# Patient Record
Sex: FEMALE | NOT HISPANIC OR LATINO | URBAN - METROPOLITAN AREA
[De-identification: names, ages, dates, MRNs, and addresses within clinical notes are randomized per-mention and may not be internally consistent; named-entity substitution may affect disease eponyms.]

---

## 2017-10-16 ENCOUNTER — INPATIENT (INPATIENT)
Facility: HOSPITAL | Age: 82
LOS: 7 days | Discharge: NURSING, REHAB OR LONG-TERM CARE | DRG: 185 | End: 2017-10-24
Attending: STUDENT IN AN ORGANIZED HEALTH CARE EDUCATION/TRAINING PROGRAM | Admitting: STUDENT IN AN ORGANIZED HEALTH CARE EDUCATION/TRAINING PROGRAM
Payer: MEDICARE

## 2017-10-16 VITALS
RESPIRATION RATE: 18 BRPM | TEMPERATURE: 98 F | OXYGEN SATURATION: 98 % | WEIGHT: 139.99 LBS | HEIGHT: 63 IN | HEART RATE: 68 BPM | DIASTOLIC BLOOD PRESSURE: 77 MMHG | SYSTOLIC BLOOD PRESSURE: 129 MMHG

## 2017-10-16 DIAGNOSIS — Z90.710 ACQUIRED ABSENCE OF BOTH CERVIX AND UTERUS: Chronic | ICD-10-CM

## 2017-10-16 DIAGNOSIS — Z90.49 ACQUIRED ABSENCE OF OTHER SPECIFIED PARTS OF DIGESTIVE TRACT: Chronic | ICD-10-CM

## 2017-10-16 DIAGNOSIS — S22.49XA MULTIPLE FRACTURES OF RIBS, UNSPECIFIED SIDE, INITIAL ENCOUNTER FOR CLOSED FRACTURE: ICD-10-CM

## 2017-10-16 LAB
ALBUMIN SERPL ELPH-MCNC: 3.9 G/DL — SIGNIFICANT CHANGE UP (ref 3.3–5.2)
ALLERGY+IMMUNOLOGY DIAG STUDY NOTE: SIGNIFICANT CHANGE UP
ALP SERPL-CCNC: 50 U/L — SIGNIFICANT CHANGE UP (ref 40–120)
ALT FLD-CCNC: 15 U/L — SIGNIFICANT CHANGE UP
ANION GAP SERPL CALC-SCNC: 15 MMOL/L — SIGNIFICANT CHANGE UP (ref 5–17)
APTT BLD: 33.6 SEC — SIGNIFICANT CHANGE UP (ref 27.5–37.4)
AST SERPL-CCNC: 17 U/L — SIGNIFICANT CHANGE UP
BASOPHILS # BLD AUTO: 0 K/UL — SIGNIFICANT CHANGE UP (ref 0–0.2)
BASOPHILS NFR BLD AUTO: 0.5 % — SIGNIFICANT CHANGE UP (ref 0–2)
BILIRUB SERPL-MCNC: 0.4 MG/DL — SIGNIFICANT CHANGE UP (ref 0.4–2)
BLD GP AB SCN SERPL QL: ABNORMAL
BUN SERPL-MCNC: 33 MG/DL — HIGH (ref 8–20)
CALCIUM SERPL-MCNC: 8.7 MG/DL — SIGNIFICANT CHANGE UP (ref 8.6–10.2)
CHLORIDE SERPL-SCNC: 98 MMOL/L — SIGNIFICANT CHANGE UP (ref 98–107)
CK SERPL-CCNC: 66 U/L — SIGNIFICANT CHANGE UP (ref 25–170)
CO2 SERPL-SCNC: 23 MMOL/L — SIGNIFICANT CHANGE UP (ref 22–29)
CREAT SERPL-MCNC: 1.58 MG/DL — HIGH (ref 0.5–1.3)
DIR ANTIGLOB POLYSPECIFIC INTERPRETATION: SIGNIFICANT CHANGE UP
EOSINOPHIL # BLD AUTO: 0.2 K/UL — SIGNIFICANT CHANGE UP (ref 0–0.5)
EOSINOPHIL NFR BLD AUTO: 2 % — SIGNIFICANT CHANGE UP (ref 0–6)
GLUCOSE SERPL-MCNC: 181 MG/DL — HIGH (ref 70–115)
HCT VFR BLD CALC: 34.1 % — LOW (ref 37–47)
HGB BLD-MCNC: 11 G/DL — LOW (ref 12–16)
INR BLD: 1.1 RATIO — SIGNIFICANT CHANGE UP (ref 0.88–1.16)
LYMPHOCYTES # BLD AUTO: 1.9 K/UL — SIGNIFICANT CHANGE UP (ref 1–4.8)
LYMPHOCYTES # BLD AUTO: 17.9 % — LOW (ref 20–55)
MCHC RBC-ENTMCNC: 31 PG — SIGNIFICANT CHANGE UP (ref 27–31)
MCHC RBC-ENTMCNC: 32.3 G/DL — SIGNIFICANT CHANGE UP (ref 32–36)
MCV RBC AUTO: 96.1 FL — SIGNIFICANT CHANGE UP (ref 81–99)
MONOCYTES # BLD AUTO: 0.6 K/UL — SIGNIFICANT CHANGE UP (ref 0–0.8)
MONOCYTES NFR BLD AUTO: 5.4 % — SIGNIFICANT CHANGE UP (ref 3–10)
NEUTROPHILS # BLD AUTO: 7.8 K/UL — SIGNIFICANT CHANGE UP (ref 1.8–8)
NEUTROPHILS NFR BLD AUTO: 73.9 % — HIGH (ref 37–73)
NT-PROBNP SERPL-SCNC: 726 PG/ML — HIGH (ref 0–300)
PLATELET # BLD AUTO: 414 K/UL — HIGH (ref 150–400)
POTASSIUM SERPL-MCNC: 4.7 MMOL/L — SIGNIFICANT CHANGE UP (ref 3.5–5.3)
POTASSIUM SERPL-SCNC: 4.7 MMOL/L — SIGNIFICANT CHANGE UP (ref 3.5–5.3)
PROT SERPL-MCNC: 7.2 G/DL — SIGNIFICANT CHANGE UP (ref 6.6–8.7)
PROTHROM AB SERPL-ACNC: 12.1 SEC — SIGNIFICANT CHANGE UP (ref 9.8–12.7)
RBC # BLD: 3.55 M/UL — LOW (ref 4.4–5.2)
RBC # FLD: 16.2 % — HIGH (ref 11–15.6)
SODIUM SERPL-SCNC: 136 MMOL/L — SIGNIFICANT CHANGE UP (ref 135–145)
TROPONIN T SERPL-MCNC: <0.01 NG/ML — SIGNIFICANT CHANGE UP (ref 0–0.06)
TYPE + AB SCN PNL BLD: SIGNIFICANT CHANGE UP
WBC # BLD: 10.5 K/UL — SIGNIFICANT CHANGE UP (ref 4.8–10.8)
WBC # FLD AUTO: 10.5 K/UL — SIGNIFICANT CHANGE UP (ref 4.8–10.8)

## 2017-10-16 PROCEDURE — 70450 CT HEAD/BRAIN W/O DYE: CPT | Mod: 26

## 2017-10-16 PROCEDURE — 74177 CT ABD & PELVIS W/CONTRAST: CPT | Mod: 26

## 2017-10-16 PROCEDURE — 71010: CPT | Mod: 26

## 2017-10-16 PROCEDURE — 99291 CRITICAL CARE FIRST HOUR: CPT

## 2017-10-16 PROCEDURE — 86077 PHYS BLOOD BANK SERV XMATCH: CPT

## 2017-10-16 PROCEDURE — 71260 CT THORAX DX C+: CPT | Mod: 26

## 2017-10-16 PROCEDURE — 72125 CT NECK SPINE W/O DYE: CPT | Mod: 26

## 2017-10-16 RX ORDER — GABAPENTIN 400 MG/1
300 CAPSULE ORAL EVERY 8 HOURS
Qty: 0 | Refills: 0 | Status: DISCONTINUED | OUTPATIENT
Start: 2017-10-16 | End: 2017-10-24

## 2017-10-16 RX ORDER — ICOSAPENT ETHYL 500 MG/1
0 CAPSULE, LIQUID FILLED ORAL
Qty: 0 | Refills: 0 | COMMUNITY

## 2017-10-16 RX ORDER — HEPARIN SODIUM 5000 [USP'U]/ML
5000 INJECTION INTRAVENOUS; SUBCUTANEOUS EVERY 8 HOURS
Qty: 0 | Refills: 0 | Status: DISCONTINUED | OUTPATIENT
Start: 2017-10-16 | End: 2017-10-24

## 2017-10-16 RX ORDER — ASPIRIN/CALCIUM CARB/MAGNESIUM 324 MG
0 TABLET ORAL
Qty: 0 | Refills: 0 | COMMUNITY

## 2017-10-16 RX ORDER — PANTOPRAZOLE SODIUM 20 MG/1
40 TABLET, DELAYED RELEASE ORAL
Qty: 0 | Refills: 0 | Status: DISCONTINUED | OUTPATIENT
Start: 2017-10-16 | End: 2017-10-24

## 2017-10-16 RX ORDER — LABETALOL HCL 100 MG
10 TABLET ORAL ONCE
Qty: 0 | Refills: 0 | Status: COMPLETED | OUTPATIENT
Start: 2017-10-16 | End: 2017-10-16

## 2017-10-16 RX ORDER — LABETALOL HCL 100 MG
200 TABLET ORAL EVERY 12 HOURS
Qty: 0 | Refills: 0 | Status: DISCONTINUED | OUTPATIENT
Start: 2017-10-16 | End: 2017-10-24

## 2017-10-16 RX ORDER — ACETAMINOPHEN 500 MG
975 TABLET ORAL EVERY 8 HOURS
Qty: 0 | Refills: 0 | Status: DISCONTINUED | OUTPATIENT
Start: 2017-10-16 | End: 2017-10-24

## 2017-10-16 RX ORDER — LABETALOL HCL 100 MG
0 TABLET ORAL
Qty: 0 | Refills: 0 | COMMUNITY

## 2017-10-16 RX ORDER — ACETAMINOPHEN 500 MG
650 TABLET ORAL ONCE
Qty: 0 | Refills: 0 | Status: COMPLETED | OUTPATIENT
Start: 2017-10-16 | End: 2017-10-16

## 2017-10-16 RX ORDER — ASPIRIN/CALCIUM CARB/MAGNESIUM 324 MG
81 TABLET ORAL DAILY
Qty: 0 | Refills: 0 | Status: DISCONTINUED | OUTPATIENT
Start: 2017-10-16 | End: 2017-10-24

## 2017-10-16 RX ORDER — MORPHINE SULFATE 50 MG/1
2 CAPSULE, EXTENDED RELEASE ORAL ONCE
Qty: 0 | Refills: 0 | Status: DISCONTINUED | OUTPATIENT
Start: 2017-10-16 | End: 2017-10-16

## 2017-10-16 RX ORDER — KETOROLAC TROMETHAMINE 30 MG/ML
10 SYRINGE (ML) INJECTION ONCE
Qty: 0 | Refills: 0 | Status: DISCONTINUED | OUTPATIENT
Start: 2017-10-16 | End: 2017-10-16

## 2017-10-16 RX ORDER — LIDOCAINE 4 G/100G
2 CREAM TOPICAL EVERY 24 HOURS
Qty: 0 | Refills: 0 | Status: DISCONTINUED | OUTPATIENT
Start: 2017-10-16 | End: 2017-10-24

## 2017-10-16 RX ORDER — OMEPRAZOLE 10 MG/1
0 CAPSULE, DELAYED RELEASE ORAL
Qty: 0 | Refills: 0 | COMMUNITY

## 2017-10-16 RX ORDER — ONDANSETRON 8 MG/1
4 TABLET, FILM COATED ORAL ONCE
Qty: 0 | Refills: 0 | Status: COMPLETED | OUTPATIENT
Start: 2017-10-16 | End: 2017-10-16

## 2017-10-16 RX ADMIN — Medication 200 MILLIGRAM(S): at 18:07

## 2017-10-16 RX ADMIN — LIDOCAINE 2 PATCH: 4 CREAM TOPICAL at 18:06

## 2017-10-16 RX ADMIN — Medication 975 MILLIGRAM(S): at 21:10

## 2017-10-16 RX ADMIN — ONDANSETRON 4 MILLIGRAM(S): 8 TABLET, FILM COATED ORAL at 12:33

## 2017-10-16 RX ADMIN — Medication 650 MILLIGRAM(S): at 09:43

## 2017-10-16 RX ADMIN — GABAPENTIN 300 MILLIGRAM(S): 400 CAPSULE ORAL at 22:26

## 2017-10-16 RX ADMIN — HEPARIN SODIUM 5000 UNIT(S): 5000 INJECTION INTRAVENOUS; SUBCUTANEOUS at 22:27

## 2017-10-16 RX ADMIN — MORPHINE SULFATE 2 MILLIGRAM(S): 50 CAPSULE, EXTENDED RELEASE ORAL at 13:30

## 2017-10-16 RX ADMIN — Medication 10 MILLIGRAM(S): at 19:30

## 2017-10-16 RX ADMIN — MORPHINE SULFATE 2 MILLIGRAM(S): 50 CAPSULE, EXTENDED RELEASE ORAL at 13:15

## 2017-10-16 RX ADMIN — Medication 10 MILLIGRAM(S): at 16:42

## 2017-10-16 RX ADMIN — Medication 10 MILLIGRAM(S): at 18:07

## 2017-10-16 RX ADMIN — Medication 975 MILLIGRAM(S): at 21:40

## 2017-10-16 NOTE — H&P ADULT - HISTORY OF PRESENT ILLNESS
89 y/o F presents to the ED s/p fall. She states she fell friday where she went to an urgent care and wastold she had 1-2 rib fractures and to follow up with her primary care doctor. She came to the ED due to progression of pain to the right side with SOB. She states she lost her balance and fell backwards landing on her right side. Denies syncope, LOC, hitting her head, nausea, emesis, fevers, chills. She pulls 500cc on IS.   Pupils reactive b/l 4mm, GCS 15  A: airway is intact  B: b/l course breath sounds  C; RRR

## 2017-10-16 NOTE — ED PROVIDER NOTE - CARE PLAN
Principal Discharge DX:	Fracture of ribs, five Principal Discharge DX:	Fracture of ribs, five  Secondary Diagnosis:	Fall  Secondary Diagnosis:	Hypoxemia

## 2017-10-16 NOTE — ED PROVIDER NOTE - PROGRESS NOTE DETAILS
spoek with radiology they are to amend repost showing rib fx. spoke with trauma team they are to see patient pain currently controlled and family aware of plan of care

## 2017-10-16 NOTE — ED PROVIDER NOTE - MEDICAL DECISION MAKING DETAILS
hhigih risk rib fx hypoexmiea need for admission pain control is trauma consulted and recs implemented. need for mutliple bedside reassessments pain control tx 200 systolic bp and evaluation for any worsening hypoxemia . family at [facility] agrees to paln of care long time education on high risk morbidity and mortality with multiple rib fx in older population

## 2017-10-16 NOTE — H&P ADULT - NSHPPHYSICALEXAM_GEN_ALL_CORE
GEb: A&Ox3, NAD  HEENT: NC on 2L , No cervical neck tenderness, EOMI, Pupils reactive b/l 4mm  Cardio: RRR  Pulm: coarse breath sounds b/l   Gastrointestinal: abd soft, ND, NT  musculoskeletal: TTP to right chest, 5/5 strength in all extremities, Distal sensation intact, distal pulses intact  Neuro: GCS 15 CN II-XII intact

## 2017-10-16 NOTE — ED ADULT TRIAGE NOTE - CHIEF COMPLAINT QUOTE
pt biba after family reports a brief period of not responding to them while sitting at table eating breakfast. pt remained awake with eyes open but did not answer, lasted about 2 seconds. pt offers no complaint at this time. hx of fall on friday with 3 broken ribs. pt a&o x3, accucecheck is:

## 2017-10-16 NOTE — ED PROVIDER NOTE - CRITICAL CARE PROVIDED
interpretation of diagnostic studies/direct patient care (not related to procedure)/additional history taking/documentation

## 2017-10-16 NOTE — ED ADULT NURSE NOTE - OBJECTIVE STATEMENT
AOX3 pt fell on Friday, was taken to urgent care and was told that she broke 3 ribs and she also had fluid in her lungs. Pt lost her balance, tripped over the curb, fell on her back and right side. Pt denies any pain at the moment, daughter states she looks like shes out of breath. This morning pt looked like she was staring into space and was not responding. Pt took her Tramadol at 0630. Blood sent to labs, pt placed on cardiac monitor

## 2017-10-16 NOTE — ED PROVIDER NOTE - MUSCULOSKELETAL, MLM
Spine appears normal, range of motion is not limited, + ttp right posterior toracic cavity no flail chest

## 2017-10-16 NOTE — ED ADULT NURSE REASSESSMENT NOTE - NS ED NURSE REASSESS COMMENT FT1
Assuming care from previous RN, pt AOx4, denies SOB, resp even and unlabored, LS clear and equal bilaterally, sat @ 98% on 2L nasal cannula, skin warm and dry, color good, patent 20G IV in left AC, denies pain/n/v at this time, showing NSR on monitor, 2 lidocaine patches on right side placed today, family @ bedside, room assigned, will call report, will continue to monitor.
Report given to receiving RN Michelle, awaiting transport to floor, pt and family aware of plan of care.
Trauma team at bedside.
patient A&OX3. Denies any pain or discomfort at this time. CM in place. NSR. pt hypertensive. MD aware. Family at bedside.
patient A&OX3. c/o mild back pain. CM in place. NSR. pt hypertensive. MD aware. Family at bedside.

## 2017-10-16 NOTE — H&P ADULT - ASSESSMENT
89 y/o F s/p fall sustaining 3 minimally displaced fractures of the right lateral seventh , eighth , ninth ribs ribs. Posterior aspect  of the right 11th and 12th ribs are also fractured and distracted. with anterior lateral hematoma near the liver   - Admit to trauma service   - Regular diet  - IS  - ABG, CXR  - Rib fracture protocol for pain  - restart home meds  - PT/OT   - OOB

## 2017-10-16 NOTE — ED PROVIDER NOTE - OBJECTIVE STATEMENT
89 y/o F presents to the ED s/p fall. She states she fell friday where she went to an urgent care and wastold she had 1-2 rib fractures and to follow up with her primary care doctor. She came to the ED due to progression of pain to the right side with SOB. She states she lost her balance and fell backwards landing on her right side. Denies syncope, LOC, hitting her head, nausea, emesis, fevers, chills. + right sided back pain constant achy worse with breathing 8/10 n o hemoptysis

## 2017-10-17 ENCOUNTER — TRANSCRIPTION ENCOUNTER (OUTPATIENT)
Age: 82
End: 2017-10-17

## 2017-10-17 LAB
BASE EXCESS BLDA CALC-SCNC: 3.5 MMOL/L — HIGH (ref -3–3)
BLOOD GAS COMMENTS ARTERIAL: SIGNIFICANT CHANGE UP
GAS PNL BLDA: SIGNIFICANT CHANGE UP
HCO3 BLDA-SCNC: 28 MMOL/L — HIGH (ref 20–26)
HOROWITZ INDEX BLDA+IHG-RTO: 2.5 — SIGNIFICANT CHANGE UP
PCO2 BLDA: 47 MMHG — HIGH (ref 35–45)
PH BLDA: 7.39 — SIGNIFICANT CHANGE UP (ref 7.35–7.45)
PO2 BLDA: 94 MMHG — SIGNIFICANT CHANGE UP (ref 83–108)
SAO2 % BLDA: 98 % — SIGNIFICANT CHANGE UP (ref 95–99)

## 2017-10-17 RX ORDER — FUROSEMIDE 40 MG
20 TABLET ORAL EVERY OTHER DAY
Qty: 0 | Refills: 0 | Status: DISCONTINUED | OUTPATIENT
Start: 2017-10-17 | End: 2017-10-20

## 2017-10-17 RX ORDER — FUROSEMIDE 40 MG
40 TABLET ORAL EVERY OTHER DAY
Qty: 0 | Refills: 0 | Status: DISCONTINUED | OUTPATIENT
Start: 2017-10-17 | End: 2017-10-20

## 2017-10-17 RX ADMIN — GABAPENTIN 300 MILLIGRAM(S): 400 CAPSULE ORAL at 21:17

## 2017-10-17 RX ADMIN — HEPARIN SODIUM 5000 UNIT(S): 5000 INJECTION INTRAVENOUS; SUBCUTANEOUS at 15:44

## 2017-10-17 RX ADMIN — Medication 975 MILLIGRAM(S): at 06:21

## 2017-10-17 RX ADMIN — Medication 200 MILLIGRAM(S): at 17:47

## 2017-10-17 RX ADMIN — Medication 200 MILLIGRAM(S): at 06:21

## 2017-10-17 RX ADMIN — Medication 975 MILLIGRAM(S): at 16:30

## 2017-10-17 RX ADMIN — Medication 975 MILLIGRAM(S): at 15:44

## 2017-10-17 RX ADMIN — LIDOCAINE 2 PATCH: 4 CREAM TOPICAL at 05:15

## 2017-10-17 RX ADMIN — Medication 975 MILLIGRAM(S): at 21:17

## 2017-10-17 RX ADMIN — HEPARIN SODIUM 5000 UNIT(S): 5000 INJECTION INTRAVENOUS; SUBCUTANEOUS at 21:17

## 2017-10-17 RX ADMIN — HEPARIN SODIUM 5000 UNIT(S): 5000 INJECTION INTRAVENOUS; SUBCUTANEOUS at 06:21

## 2017-10-17 RX ADMIN — PANTOPRAZOLE SODIUM 40 MILLIGRAM(S): 20 TABLET, DELAYED RELEASE ORAL at 06:21

## 2017-10-17 RX ADMIN — GABAPENTIN 300 MILLIGRAM(S): 400 CAPSULE ORAL at 15:44

## 2017-10-17 RX ADMIN — Medication 975 MILLIGRAM(S): at 22:00

## 2017-10-17 RX ADMIN — LIDOCAINE 2 PATCH: 4 CREAM TOPICAL at 17:47

## 2017-10-17 RX ADMIN — GABAPENTIN 300 MILLIGRAM(S): 400 CAPSULE ORAL at 06:21

## 2017-10-17 NOTE — PHYSICAL THERAPY INITIAL EVALUATION ADULT - LEVEL OF INDEPENDENCE: GAIT, REHAB EVAL
first 50 feet pt was min assist of 1 person with RW, second 50 feet pt mod assist of 1 person 2/2 fatigue and multiple episodes of LOB

## 2017-10-17 NOTE — CONSULT NOTE ADULT - ATTENDING COMMENTS
Agree with resident.   Patient is s/p fall sustaining rib fractures.  Recommend [facility] for rehab if unable to obtain DC home safely with bedside therapy.  Patient does not meet acute rehab criteria.

## 2017-10-17 NOTE — DISCHARGE NOTE ADULT - PROVIDER TOKENS
FREE:[LAST:[[facility]PHONE:[phone]],FAX:[(   [phone]    -],ADDRESS:[[address] [place] [address] - [address] [place] [address]]]

## 2017-10-17 NOTE — CONSULT NOTE ADULT - SUBJECTIVE AND OBJECTIVE BOX
HPI:  Ms. Lawson is a 89 y/o F with a PMHx of HTN who presented to [facility] on 10/16 ED s/p fall. Denies head trauma or LOC. GCS = 15. She states she fell Friday (10/13), and she went to an urgent care and was told she had rib fractures and to follow up with her primary care doctor. She came to the ED due to progression of pain to the right side with associated SOB. She states she lost her balance and fell backwards landing on her right side. Managed non-operatively.    Today,    REVIEW OF SYSTEMS  Constitutional - No fever, No fatigue  HEENT - No visual disturbances, No difficulty hearing,  No neck pain  Respiratory - No cough, No wheezing, No shortness of breath  Cardiovascular - No chest pain, No palpitations  Gastrointestinal - No abdominal pain, No nausea, No vomiting, No diarrhea, No constipation  Genitourinary - No dysuria, No frequency, No hematuria, No incontinence  Neurological - No headaches, No loss of strength, No numbness  Skin - No itching, No rashes  Endocrine - No temperature intolerance  Musculoskeletal - No joint pain, No joint swelling, No muscle pain  Psychiatric - No depression, No anxiety  All other review of systems negative    PAST MEDICAL & SURGICAL HISTORY  HTN  H/O total hysterectomy  History of appendectomy    SOCIAL HISTORY  Smoking - Denied  EtOH - Denied   Drugs - Denied    FUNCTIONAL HISTORY  _______ hand dominant  Lives with daughter and sister in a private home with 3 [place] + HR and no stairs inside  Independent in ambulation, ADL's, transfers prior to hospitalization    CURRENT FUNCTIONAL STATUS  Bed mobility - Min A  Transfers - Min a  Gait - 50' using RW requiring Min A + 50' using RW requiring Mod A    FAMILY HISTORY   Reviewed and non-contributory    ALLERGIES  penicillin (Short breath)    VITALS  T(C): 37.2 (10-17-17 @ 15:57)  T(F): 99 (10-17-17 @ 15:57), Max: 99 (10-17-17 @ 15:57)  HR: 67 (10-17-17 @ 15:57) (62 - 72)  BP: 124/60 (10-17-17 @ 15:57) (124/60 - 227/115)  RR:  (18 - 20)  SpO2:  (95% - 98%)  Wt(kg): --    PHYSICAL EXAM  Constitutional - NAD, Comfortable  HEENT - NCAT, EOMI  Neck - Supple  Chest - CTA bilaterally  Cardiovascular - RRR, S1S2  Abdomen - BS+, Soft, NTND  Extremities - No C/C/E, No calf tenderness   Neurologic Exam -                    Cognitive - Awake, Alert, AAO to self, place, date, year, situation     Communication - Fluent, No dysarthria     Attention - Intact, able to recite days of week backwards     Memory - Intact, able to recall 3/3 items after 3 minutes     Cranial Nerves - CN 2-12 grossly intact     Motor -                     LEFT    UE - ShAB 5/5, EF 5/5, EE 5/5, WE 5/5,  5/5                    RIGHT UE - ShAB 5/5, EF 5/5, EE 5/5, WE 5/5,  5/5                    LEFT    LE - HF 5/5, KE 5/5, DF 5/5, PF 5/5                    RIGHT LE - HF 5/5, KE 5/5, DF 5/5, PF 5/5        Sensory - Intact to light touch     Reflexes - DTR intact and symmetrical, Negative Rushing's bilaterally, Negative Babinski's bilaterally      Coordination - Finger-to-nose intact bilaterally   Psychiatric - Affect WNL    RECENT LABS/IMAGING             9.8    8.3   )-----------( 381      ( 17 Oct 2017 08:22 )             30.6     140  |  100  |  36.0<H>  ----------------------------<  109  4.6   |  28.0  |  1.67<H>    Ca    8.4<L>      17 Oct 2017 08:22    TPro  7.2  /  Alb  3.9  /  TBili  0.4  /  DBili  x   /  AST  17  /  ALT  15  /  AlkPhos  50  10-16    PT/INR - ( 17 Oct 2017 08:22 )   PT: 12.1 sec;   INR: 1.10 ratio    PTT - ( 17 Oct 2017 08:22 )  PTT:30.7 sec    MEDICATIONS   MEDICATIONS  (STANDING):  acetaminophen   Tablet. 975 milliGRAM(s) Oral every 8 hours  aspirin  chewable 81 milliGRAM(s) Oral daily  furosemide    Tablet 20 milliGRAM(s) Oral every other day  furosemide    Tablet 40 milliGRAM(s) Oral every other day  gabapentin 300 milliGRAM(s) Oral every 8 hours  heparin  Injectable 5000 Unit(s) SubCutaneous every 8 hours  labetalol 200 milliGRAM(s) Oral every 12 hours  lidocaine   Patch 2 Patch Transdermal every 24 hours  pantoprazole    Tablet 40 milliGRAM(s) Oral before breakfast    ASSESSMENT/PLAN  89 y/o female with multiple right rib fractures secondary to a fall. She is now with functional, gait, ADL impairments.    Disposition -  PT - ROM, Bed mobility, Transfers, Ambulation with assistive device  OT - ADLs, ROM  Precautions - Falls, Cardiac  Pain control - Lidoderm, Gabapentin, Tylenol SESAR  DVT Prophylaxis - Heparin  Weight bearing status - Full weight bearing  Skin - Turn Q2hrs  Diet - Regular/thins HPI:  Ms. Lawson is a 89 y/o F with a PMHx of HTN who presented to [facility] on 10/16 ED s/p fall when she was stepping off of a curb. Reports that she fell backwards to her right side. Denies head trauma or LOC. Was able to get up on her own. GCS = 15. She states she fell Friday (10/13), and she went to an urgent care and was told she had rib fractures and to follow up with her primary care doctor. She came to the ED due to progression of pain to the right side with associated SOB. She states she lost her balance and fell backwards landing on her right side. Managed non-operatively.    Today, she reports that she has pain on the right sided chest wall pain especially with deep breathes. Tolerated therapy well today, had mild pain. Denies shortness of breathe or coughing. No numbness/tingling, or weakness. No bowel or bladder difficulties. States she doesn't like the food.    REVIEW OF SYSTEMS  Constitutional - No fever, No fatigue  HEENT - No visual disturbances, No difficulty hearing,  No neck pain  Respiratory - No cough, No wheezing, No shortness of breath  Cardiovascular - +right chest wall pain with deep breathes, No palpitations  Gastrointestinal - No abdominal pain, No nausea, No vomiting, No diarrhea, No constipation  Genitourinary - No dysuria, No frequency, No hematuria, No incontinence  Neurological - No headaches, No loss of strength, No numbness  Skin - No itching, No rashes  Endocrine - No temperature intolerance  Musculoskeletal - No joint pain, No joint swelling, No muscle pain  Psychiatric - No depression, No anxiety  All other review of systems negative    PAST MEDICAL & SURGICAL HISTORY  HTN  H/O total hysterectomy  History of appendectomy    SOCIAL HISTORY  Retired - former worked at [employer]  Smoking - Denied  EtOH - Denied   Drugs - Denied    FUNCTIONAL HISTORY  Right hand dominant  Lives with daughter and sister in a private home with 3 [place] + HR and 10 stairs up and down, only would have to go downstairs to use washer  Independent in ambulation, ADL's, transfers prior to hospitalization. Previously driving.    CURRENT FUNCTIONAL STATUS  Bed mobility - Min A  Transfers - Min a  Gait - 50' using RW requiring Min A + 50' using RW requiring Mod A    FAMILY HISTORY   Reviewed and non-contributory    ALLERGIES  penicillin    VITALS  T(C): 37.2 (10-17-17 @ 15:57)  T(F): 99 (10-17-17 @ 15:57), Max: 99 (10-17-17 @ 15:57)  HR: 67 (10-17-17 @ 15:57) (62 - 72)  BP: 124/60 (10-17-17 @ 15:57) (124/60 - 227/115)  RR:  (18 - 20)  SpO2:  (95% - 98%)  Wt(kg): --    PHYSICAL EXAM  Constitutional - NAD, Comfortable  HEENT - NCAT, EOMI  Neck - Supple  Chest - Decreased breathe sounds right sided (likely secondary to guarding from pain), +Supplemental O2  Cardiovascular - RRR, S1S2  Abdomen - BS+, Soft, NTND  Extremities - No C/C/E, No calf tenderness   Neurologic Exam -                    Cognitive - Awake, Alert, AAO to self, place, date, year, situation     Communication - Fluent     Motor -                     LEFT    UE - ShAB 5/5, EF 5/5, EE 5/5, WE 5/5,  5/5                    RIGHT UE - ShAB 5/5, EF 5/5, EE 5/5, WE 5/5,  5/5                    LEFT    LE - HF 5/5, KE 5/5, DF 5/5, PF 5/5                    RIGHT LE - HF 5/5, KE 5/5, DF 5/5, PF 5/5        Sensory - Intact to light touch  Psychiatric - Affect WNL    RECENT LABS/IMAGING             9.8    8.3   )-----------( 381      ( 17 Oct 2017 08:22 )             30.6     140  |  100  |  36.0<H>  ----------------------------<  109  4.6   |  28.0  |  1.67<H>    Ca    8.4<L>      17 Oct 2017 08:22    TPro  7.2  /  Alb  3.9  /  TBili  0.4  /  DBili  x   /  AST  17  /  ALT  15  /  AlkPhos  50  10-16    PT/INR - ( 17 Oct 2017 08:22 )   PT: 12.1 sec;   INR: 1.10 ratio    PTT - ( 17 Oct 2017 08:22 )  PTT:30.7 sec    MEDICATIONS   MEDICATIONS  (STANDING):  acetaminophen   Tablet. 975 milliGRAM(s) Oral every 8 hours  aspirin  chewable 81 milliGRAM(s) Oral daily  furosemide    Tablet 20 milliGRAM(s) Oral every other day  furosemide    Tablet 40 milliGRAM(s) Oral every other day  gabapentin 300 milliGRAM(s) Oral every 8 hours  heparin  Injectable 5000 Unit(s) SubCutaneous every 8 hours  labetalol 200 milliGRAM(s) Oral every 12 hours  lidocaine   Patch 2 Patch Transdermal every 24 hours  pantoprazole    Tablet 40 milliGRAM(s) Oral before breakfast    ASSESSMENT/PLAN  89 y/o female with multiple right rib fractures secondary to a fall. She is now with functional, gait, ADL impairments.    Disposition - Patient tolerated therapy well today, continue bedside therapy. Will continue to follow patient, with few additional days of bedside therapy, patient will hopefully be able to be discharged home with home services. Will discuss case with attending physician for final rehabilitation recommendations.  PT - ROM, Bed mobility, Transfers, Ambulation with assistive device  OT - ADLs, ROM  Precautions - Falls, Cardiac  Pain control - Consider short course of NSAID's to help relieve right rib pain, Lidoderm, Gabapentin, Tylenol SESAR  DVT Prophylaxis - Heparin  Weight bearing status - Full weight bearing  Skin - Turn Q2hrs  Diet - Regular/thins HPI:  Ms. Lawson is a 89 y/o F with a PMHx of HTN who presented to [facility] on 10/16 ED s/p fall when she was stepping off of a curb. Reports that she fell backwards to her right side. Denies head trauma or LOC. Was able to get up on her own. GCS = 15. She states she fell Friday (10/13), and she went to an urgent care and was told she had rib fractures and to follow up with her primary care doctor. She came to the ED due to progression of pain to the right side with associated SOB. She states she lost her balance and fell backwards landing on her right side. Managed non-operatively.    Today, she reports that she has pain on the right sided chest wall pain especially with deep breathes. Tolerated therapy well today, had mild pain. Denies shortness of breathe or coughing. No numbness/tingling, or weakness. No bowel or bladder difficulties. States she doesn't like the food.    REVIEW OF SYSTEMS  Constitutional - No fever, No fatigue  HEENT - No visual disturbances, No difficulty hearing,  No neck pain  Respiratory - No cough, No wheezing, No shortness of breath  Cardiovascular - +right chest wall pain with deep breathes, No palpitations  Gastrointestinal - No abdominal pain, No nausea, No vomiting, No diarrhea, No constipation  Genitourinary - No dysuria, No frequency, No hematuria, No incontinence  Neurological - No headaches, No loss of strength, No numbness  Skin - No itching, No rashes  Endocrine - No temperature intolerance  Musculoskeletal - No joint pain, No joint swelling, No muscle pain  Psychiatric - No depression, No anxiety  All other review of systems negative    PAST MEDICAL & SURGICAL HISTORY  HTN  H/O total hysterectomy  History of appendectomy    SOCIAL HISTORY  Retired - former worked at [employer]  Smoking - Denied  EtOH - Denied   Drugs - Denied    FUNCTIONAL HISTORY  Right hand dominant  Lives with daughter and sister in a private home with 3 [place] + HR and 10 stairs up and down, only would have to go downstairs to use washer  Independent in ambulation, ADL's, transfers prior to hospitalization. Previously driving.    CURRENT FUNCTIONAL STATUS  Bed mobility - Min A  Transfers - Min a  Gait - 50' using RW requiring Min A + 50' using RW requiring Mod A    FAMILY HISTORY   Reviewed and non-contributory    ALLERGIES  penicillin    VITALS  T(C): 37.2 (10-17-17 @ 15:57)  T(F): 99 (10-17-17 @ 15:57), Max: 99 (10-17-17 @ 15:57)  HR: 67 (10-17-17 @ 15:57) (62 - 72)  BP: 124/60 (10-17-17 @ 15:57) (124/60 - 227/115)  RR:  (18 - 20)  SpO2:  (95% - 98%)  Wt(kg): --    PHYSICAL EXAM  Constitutional - NAD, Comfortable  HEENT - NCAT, EOMI  Neck - Supple  Chest - Decreased breathe sounds right sided (likely secondary to guarding from pain), +Supplemental O2  Cardiovascular - RRR, S1S2  Abdomen - BS+, Soft, NTND  Extremities - No C/C/E, No calf tenderness   Neurologic Exam -                    Cognitive - Awake, Alert, AAO to self, place, date, year, situation     Communication - Fluent     Motor -                     LEFT    UE - ShAB 5/5, EF 5/5, EE 5/5, WE 5/5,  5/5                    RIGHT UE - ShAB 5/5, EF 5/5, EE 5/5, WE 5/5,  5/5                    LEFT    LE - HF 5/5, KE 5/5, DF 5/5, PF 5/5                    RIGHT LE - HF 5/5, KE 5/5, DF 5/5, PF 5/5        Sensory - Intact to light touch  Psychiatric - Affect WNL    RECENT LABS/IMAGING             9.8    8.3   )-----------( 381      ( 17 Oct 2017 08:22 )             30.6     140  |  100  |  36.0<H>  ----------------------------<  109  4.6   |  28.0  |  1.67<H>    Ca    8.4<L>      17 Oct 2017 08:22    TPro  7.2  /  Alb  3.9  /  TBili  0.4  /  DBili  x   /  AST  17  /  ALT  15  /  AlkPhos  50  10-16    PT/INR - ( 17 Oct 2017 08:22 )   PT: 12.1 sec;   INR: 1.10 ratio    PTT - ( 17 Oct 2017 08:22 )  PTT:30.7 sec    MEDICATIONS   MEDICATIONS  (STANDING):  acetaminophen   Tablet. 975 milliGRAM(s) Oral every 8 hours  aspirin  chewable 81 milliGRAM(s) Oral daily  furosemide    Tablet 20 milliGRAM(s) Oral every other day  furosemide    Tablet 40 milliGRAM(s) Oral every other day  gabapentin 300 milliGRAM(s) Oral every 8 hours  heparin  Injectable 5000 Unit(s) SubCutaneous every 8 hours  labetalol 200 milliGRAM(s) Oral every 12 hours  lidocaine   Patch 2 Patch Transdermal every 24 hours  pantoprazole    Tablet 40 milliGRAM(s) Oral before breakfast    ASSESSMENT/PLAN  89 y/o female with multiple right rib fractures secondary to a fall. She is now with functional, gait, ADL impairments.    Disposition - Patient tolerated therapy well today, continue bedside therapy. PT - ROM, Bed mobility, Transfers, Ambulation with assistive device  OT - ADLs, ROM  Precautions - Falls, Cardiac  Pain control - Consider short course of NSAID's to help relieve right rib pain, Lidoderm, Gabapentin, Tylenol SESAR  DVT Prophylaxis - Heparin  Weight bearing status - Full weight bearing  Skin - Turn Q2hrs  Diet - Regular/thins

## 2017-10-17 NOTE — DISCHARGE NOTE ADULT - MEDICATION SUMMARY - MEDICATIONS TO TAKE
I will START or STAY ON the medications listed below when I get home from the hospital:    Aspir 81  -- Indication: For Home    acetaminophen 325 mg oral tablet  -- 3 tab(s) by mouth every 8 hours  -- Indication: For pain    gabapentin 300 mg oral capsule  -- 1 cap(s) by mouth every 8 hours  -- Indication: For pain    Vascepa 1 g oral capsule  -- Indication: For Hyperlipidemia     labetalol 200 mg oral tablet  -- Indication: For HTN (hypertension)    Lidoderm 5% topical film  --  on skin   -- Indication: For pain     furosemide 20 mg oral tablet  -- 1 tab(s) by mouth every other day  -- Indication: For HTN (hypertension)    furosemide 40 mg oral tablet  -- 1 tab(s) by mouth every other day  -- Indication: For HTN (hypertension)    docusate sodium 100 mg oral capsule  -- 1 cap(s) by mouth 3 times a day  -- Indication: For Constipation     senna oral tablet  -- 2 tab(s) by mouth once a day (at bedtime)  -- Indication: For Constipation     omeprazole 40 mg oral delayed release capsule  -- Indication: For GERD

## 2017-10-17 NOTE — DISCHARGE NOTE ADULT - HOSPITAL COURSE
89 y/o F presents to the ED s/p fall. She states she fell friday where she went to an urgent care and wastold she had 1-2 rib fractures and to follow up with her primary care doctor. She came to the ED due to progression of pain to the right side with SOB. She states she lost her balance and fell backwards landing on her right side. Denies syncope, LOC, hitting her head, nausea, emesis, fevers, chills. She pulls 500cc on IS.     Hospital Course: CT head and cervical spine on admission showed no acute traumatic injuries. CT chest, abdomen, pelvis showed 3 minimally displaced fractures of the right lateral seventh, eighth,  ninth ribs ribs; posterior aspect of the right 11th and 12th ribs are also fractured and distracted; subsegmental atelectasis right lower lobe; right pleural   effusion and chest; mild cardiomegaly; no pericardial fluid; small fluid collection anterolateral to the liver; no signs of liver laceration; right renal cyst; diverticulosis left colon and sigmoid; status post ABY/BSO. Patient was admitted to the trauma service and placed on the rib fracture protocol, emphasis on good pain control and pulmonary toileting. PT and PM&R evaluated patient during her admission. At time of d/c patient remains hemodynamically well, tolerating diet, pain controlled, OOB with assistance.    Patient is advised to RETURN TO THE EMERGENCY DEPARTMENT for any of the following - worsening pain, fever/chills, nausea/vomiting, altered mental status, chest pain, shortness of breath, or any other new / worsening symptom. 89 y/o F presents to the ED s/p fall. She states she fell friday where she went to an urgent care and wastold she had 1-2 rib fractures and to follow up with her primary care doctor. She came to the ED due to progression of pain to the right side with SOB. She states she lost her balance and fell backwards landing on her right side. Denies syncope, LOC, hitting her head, nausea, emesis, fevers, chills. She pulls 500cc on IS.     Hospital Course: CT head and cervical spine on admission showed no acute traumatic injuries. CT chest, abdomen, pelvis showed 3 minimally displaced fractures of the right lateral seventh, eighth,  ninth ribs ribs; posterior aspect of the right 11th and 12th ribs are also fractured and distracted; subsegmental atelectasis right lower lobe; right pleural   effusion and chest; mild cardiomegaly; no pericardial fluid; small fluid collection anterolateral to the liver; no signs of liver laceration; right renal cyst; diverticulosis left colon and sigmoid; status post ABY/BSO. Patient was admitted to the trauma service and placed on the rib fracture protocol, emphasis on good pain control and pulmonary toileting. PT and PM&R evaluated patient during her admission. At time of d/c patient remains hemodynamically well, tolerating diet, pain controlled, OOB with assistance. Pt to be discharged to a [facility] facility     Patient is advised to RETURN TO THE EMERGENCY DEPARTMENT for any of the following - worsening pain, fever/chills, nausea/vomiting, altered mental status, chest pain, shortness of breath, or any other new / worsening symptom.

## 2017-10-17 NOTE — PROGRESS NOTE ADULT - ATTENDING COMMENTS
Patient seen and examiend.  PIC score at target.  multimodality analgesia.   \baseline ABG per protocol.  PT/OT eval.  HSQ

## 2017-10-17 NOTE — DISCHARGE NOTE ADULT - CARE PLAN
Principal Discharge DX:	Fracture of ribs, five  Goal:	Alleviation of pain and symptoms  Instructions for follow-up, activity and diet:	Follow up: Please call and make an appointment with the [facility] 10-14 days after discharge. Also, please call and make an appointment with your primary care physician as per your usual schedule.   Activity: May return to normal activities as tolerated. Please continue use of INCENTIVE SPIROMETER at home for continued pulmonary toileting.  Diet: May continue regular diet.  Medications: Please take all home medications as prescribed by your primary care doctor. Please continue use of tylenol, gabapentin, and lidoderm patches for pain relief, as needed.  Patient is advised to RETURN TO THE EMERGENCY DEPARTMENT for any of the following - worsening pain, fever/chills, nausea/vomiting, altered mental status, chest pain, shortness of breath, or any other new / worsening symptom.  Secondary Diagnosis:	HTN (hypertension)  Instructions for follow-up, activity and diet:	Please resume all home blood pressure medications at current doses, monitor blood pressure at home, and follow-up with your cardiologist and/or your primary care provider as per your usual schedule. Principal Discharge DX:	Fracture of ribs, five  Goal:	Alleviation of pain and symptoms  Instructions for follow-up, activity and diet:	Follow up: Please call and make an appointment with the [facility] 10-14 days after discharge. Also, please call and make an appointment with your primary care physician as per your usual schedule.   Activity: May return to normal activities as tolerated. Please adhere to activity recommenations as per your providers at your rehab facility. Please continue use of INCENTIVE SPIROMETER at home for continued pulmonary toileting.  Diet: May continue regular diet.  Medications: Please take all home medications as prescribed by your primary care doctor. Please continue use of tylenol, gabapentin, and lidoderm patches for pain relief, as needed.  Patient is advised to RETURN TO THE EMERGENCY DEPARTMENT for any of the following - worsening pain, fever/chills, nausea/vomiting, altered mental status, chest pain, shortness of breath, or any other new / worsening symptom.  Secondary Diagnosis:	HTN (hypertension)  Instructions for follow-up, activity and diet:	Please resume all home blood pressure medications at current doses, monitor blood pressure at home, and follow-up with your cardiologist and/or your primary care provider as per your usual schedule.

## 2017-10-17 NOTE — DISCHARGE NOTE ADULT - CARE PROVIDER_API CALL
[facility],   [facility] [address] - [address] [place] [address]  Phone: [phone]  Fax: (   )    -

## 2017-10-17 NOTE — DISCHARGE NOTE ADULT - CONDITIONS AT DISCHARGE
when bed is available when bed is available  ptx stable for discharge to [facility] when bed is available  ptx stable for discharge to [facility] with o2 @3L NC

## 2017-10-17 NOTE — PHYSICAL THERAPY INITIAL EVALUATION ADULT - ADDITIONAL COMMENTS
Pt lives in a house with 3 steps to enter (+) HR and no steps inside. Pt states she was completely independent prior to admission.

## 2017-10-17 NOTE — PHYSICAL THERAPY INITIAL EVALUATION ADULT - PLANNED THERAPY INTERVENTIONS, PT EVAL
bed mobility training/balance training/gait training/postural re-education/strengthening/transfer training/ROM/stairs

## 2017-10-17 NOTE — PROGRESS NOTE ADULT - SUBJECTIVE AND OBJECTIVE BOX
INTERVAL HPI/OVERNIGHT EVENTS: Patient seen and examined at bedside this AM. Pain is improved since yesterday and it only  hurts when moving around in bed. Patient is pulling 500 on incentive spirometer and has a weak cough. Pain is adequately controlled with current pain regimen. Patient has been tolerating her diet. Denies fevers, chills, chest pain, shortness of breath, nausea, vomiting.       MEDICATIONS  (STANDING):  acetaminophen   Tablet. 975 milliGRAM(s) Oral every 8 hours  aspirin  chewable 81 milliGRAM(s) Oral daily  gabapentin 300 milliGRAM(s) Oral every 8 hours  heparin  Injectable 5000 Unit(s) SubCutaneous every 8 hours  labetalol 200 milliGRAM(s) Oral every 12 hours  lidocaine   Patch 2 Patch Transdermal every 24 hours  pantoprazole    Tablet 40 milliGRAM(s) Oral before breakfast    MEDICATIONS  (PRN):      Vital Signs Last 24 Hrs  T(C): 36.8 (17 Oct 2017 07:09), Max: 37.1 (16 Oct 2017 19:45)  T(F): 98.2 (17 Oct 2017 07:09), Max: 98.7 (16 Oct 2017 19:45)  HR: 62 (17 Oct 2017 07:09) (62 - 72)  BP: 127/63 (17 Oct 2017 07:09) (127/63 - 227/115)  BP(mean): --  RR: 19 (17 Oct 2017 07:09) (18 - 20)  SpO2: 98% (16 Oct 2017 19:45) (95% - 100%)    Physical Exam:  General: no acute distress  CV: RRR, normal S1S2  Pulm: lungs clear to auscultation bilaterally  Abdomen: soft, nontender, nondistended  Extremities: motor/sensation grossly intact    I&O's Detail      LABS:                        9.8    8.3   )-----------( 381      ( 17 Oct 2017 08:22 )             30.6     10-17    140  |  100  |  36.0<H>  ----------------------------<  109  4.6   |  28.0  |  1.67<H>    Ca    8.4<L>      17 Oct 2017 08:22    TPro  7.2  /  Alb  3.9  /  TBili  0.4  /  DBili  x   /  AST  17  /  ALT  15  /  AlkPhos  50  10-16    PT/INR - ( 17 Oct 2017 08:22 )   PT: 12.1 sec;   INR: 1.10 ratio         PTT - ( 17 Oct 2017 08:22 )  PTT:30.7 sec      RADIOLOGY & ADDITIONAL STUDIES:

## 2017-10-17 NOTE — DISCHARGE NOTE ADULT - PLAN OF CARE
Alleviation of pain and symptoms Follow up: Please call and make an appointment with the [facility] 10-14 days after discharge. Also, please call and make an appointment with your primary care physician as per your usual schedule.   Activity: May return to normal activities as tolerated. Please continue use of INCENTIVE SPIROMETER at home for continued pulmonary toileting.  Diet: May continue regular diet.  Medications: Please take all home medications as prescribed by your primary care doctor. Please continue use of tylenol, gabapentin, and lidoderm patches for pain relief, as needed.  Patient is advised to RETURN TO THE EMERGENCY DEPARTMENT for any of the following - worsening pain, fever/chills, nausea/vomiting, altered mental status, chest pain, shortness of breath, or any other new / worsening symptom. Please resume all home blood pressure medications at current doses, monitor blood pressure at home, and follow-up with your cardiologist and/or your primary care provider as per your usual schedule. Follow up: Please call and make an appointment with the [facility] 10-14 days after discharge. Also, please call and make an appointment with your primary care physician as per your usual schedule.   Activity: May return to normal activities as tolerated. Please adhere to activity recommenations as per your providers at your rehab facility. Please continue use of INCENTIVE SPIROMETER at home for continued pulmonary toileting.  Diet: May continue regular diet.  Medications: Please take all home medications as prescribed by your primary care doctor. Please continue use of tylenol, gabapentin, and lidoderm patches for pain relief, as needed.  Patient is advised to RETURN TO THE EMERGENCY DEPARTMENT for any of the following - worsening pain, fever/chills, nausea/vomiting, altered mental status, chest pain, shortness of breath, or any other new / worsening symptom.

## 2017-10-18 LAB
ANION GAP SERPL CALC-SCNC: 9 MMOL/L — SIGNIFICANT CHANGE UP (ref 5–17)
BUN SERPL-MCNC: 47 MG/DL — HIGH (ref 8–20)
CALCIUM SERPL-MCNC: 8.7 MG/DL — SIGNIFICANT CHANGE UP (ref 8.6–10.2)
CHLORIDE SERPL-SCNC: 102 MMOL/L — SIGNIFICANT CHANGE UP (ref 98–107)
CO2 SERPL-SCNC: 29 MMOL/L — SIGNIFICANT CHANGE UP (ref 22–29)
CREAT SERPL-MCNC: 1.92 MG/DL — HIGH (ref 0.5–1.3)
GLUCOSE SERPL-MCNC: 107 MG/DL — SIGNIFICANT CHANGE UP (ref 70–115)
MAGNESIUM SERPL-MCNC: 2.5 MG/DL — SIGNIFICANT CHANGE UP (ref 1.6–2.6)
PHOSPHATE SERPL-MCNC: 3.8 MG/DL — SIGNIFICANT CHANGE UP (ref 2.4–4.7)
POTASSIUM SERPL-MCNC: 4.8 MMOL/L — SIGNIFICANT CHANGE UP (ref 3.5–5.3)
POTASSIUM SERPL-SCNC: 4.8 MMOL/L — SIGNIFICANT CHANGE UP (ref 3.5–5.3)
SODIUM SERPL-SCNC: 140 MMOL/L — SIGNIFICANT CHANGE UP (ref 135–145)

## 2017-10-18 PROCEDURE — 99222 1ST HOSP IP/OBS MODERATE 55: CPT | Mod: GC

## 2017-10-18 RX ADMIN — GABAPENTIN 300 MILLIGRAM(S): 400 CAPSULE ORAL at 06:00

## 2017-10-18 RX ADMIN — Medication 975 MILLIGRAM(S): at 21:53

## 2017-10-18 RX ADMIN — Medication 975 MILLIGRAM(S): at 06:00

## 2017-10-18 RX ADMIN — PANTOPRAZOLE SODIUM 40 MILLIGRAM(S): 20 TABLET, DELAYED RELEASE ORAL at 06:00

## 2017-10-18 RX ADMIN — HEPARIN SODIUM 5000 UNIT(S): 5000 INJECTION INTRAVENOUS; SUBCUTANEOUS at 14:49

## 2017-10-18 RX ADMIN — Medication 81 MILLIGRAM(S): at 14:53

## 2017-10-18 RX ADMIN — LIDOCAINE 2 PATCH: 4 CREAM TOPICAL at 05:41

## 2017-10-18 RX ADMIN — LIDOCAINE 2 PATCH: 4 CREAM TOPICAL at 19:02

## 2017-10-18 RX ADMIN — GABAPENTIN 300 MILLIGRAM(S): 400 CAPSULE ORAL at 14:49

## 2017-10-18 RX ADMIN — Medication 975 MILLIGRAM(S): at 06:36

## 2017-10-18 RX ADMIN — GABAPENTIN 300 MILLIGRAM(S): 400 CAPSULE ORAL at 21:53

## 2017-10-18 RX ADMIN — HEPARIN SODIUM 5000 UNIT(S): 5000 INJECTION INTRAVENOUS; SUBCUTANEOUS at 21:55

## 2017-10-18 RX ADMIN — Medication 200 MILLIGRAM(S): at 06:00

## 2017-10-18 RX ADMIN — Medication 975 MILLIGRAM(S): at 22:45

## 2017-10-18 RX ADMIN — Medication 975 MILLIGRAM(S): at 15:45

## 2017-10-18 RX ADMIN — Medication 200 MILLIGRAM(S): at 19:03

## 2017-10-18 RX ADMIN — Medication 20 MILLIGRAM(S): at 14:52

## 2017-10-18 RX ADMIN — HEPARIN SODIUM 5000 UNIT(S): 5000 INJECTION INTRAVENOUS; SUBCUTANEOUS at 06:00

## 2017-10-18 RX ADMIN — Medication 975 MILLIGRAM(S): at 14:52

## 2017-10-18 NOTE — PROGRESS NOTE ADULT - SUBJECTIVE AND OBJECTIVE BOX
INTERVAL HPI/OVERNIGHT EVENTS/SUBJECTIVE: 89yo female examined at bedside, pt and nurse state that there were no acute events or complaints overnight. Pt states that pain is well controlled and is tolerating current diet w/o any n/v/d. [+ ]OOBA, [+ ] Flatus [+ ]BM, [+ ]VOID. ISS [ 500]. Denies fever, chills, CP, SOB.    ICU Vital Signs Last 24 Hrs  T(C): 37.1 (18 Oct 2017 07:21), Max: 37.2 (17 Oct 2017 15:57)  T(F): 98.8 (18 Oct 2017 07:21), Max: 99 (17 Oct 2017 15:57)  HR: 66 (18 Oct 2017 07:21) (66 - 67)  BP: 115/68 (18 Oct 2017 07:21) (115/68 - 155/86)  RR: 20 (18 Oct 2017 07:21) (18 - 20)  SpO2: 99% (18 Oct 2017 07:21) (97% - 100%)    ABG - ( 17 Oct 2017 10:05 )  pH: 7.39  /  pCO2: 47    /  pO2: 94    / HCO3: 28    / Base Excess: 3.5   /  SaO2: 98        MEDICATIONS  (STANDING):    Musculoskeletal: No focal deficits, strength 5/5 through out all extremities bilaterally, Baseline ROM.    acetaminophen   Tablet. 975 milliGRAM(s) Oral every 8 hours  aspirin  chewable 81 milliGRAM(s) Oral daily  furosemide    Tablet 20 milliGRAM(s) Oral every other day  furosemide    Tablet 40 milliGRAM(s) Oral every other day  gabapentin 300 milliGRAM(s) Oral every 8 hours  heparin  Injectable 5000 Unit(s) SubCutaneous every 8 hours  labetalol 200 milliGRAM(s) Oral every 12 hours  lidocaine   Patch 2 Patch Transdermal every 24 hours  pantoprazole    Tablet 40 milliGRAM(s) Oral before breakfast    NUTRITION/IVF: regular diet    PHYSICAL EXAM:    Gen: Alert, NAD, Mentating well    Eyes: EOMI, PERRL    Neurological: A&Ox3, GCS 15, Baseline mental status    ENMT: Nares patent w/o discharge, MMM, no LAD     Neck: supple, no masses appreciated, Trachea midline, No JVD    Pulmonary: CTA BL, Non labored, chest rise is  symmetrical with respiration    Cardiovascular: S1S2 RRR    Gastrointestinal: Abdomen soft and non tympanic, NTND, +BS x4    Genitourinary: No Serna, no discharge or rash.    Back: No CVA tenderness    Extremities: No gross deformities or angulations, No calf tenderness, Distal pulses intact, Capillary refill < 2sec    Skin: Warm and dry, no rash.         LABS:  CBC Full  -  ( 17 Oct 2017 08:22 )  WBC Count : 8.3 K/uL  Hemoglobin : 9.8 g/dL  Hematocrit : 30.6 %  Platelet Count - Automated : 381 K/uL  Mean Cell Volume : 96.8 fl  Mean Cell Hemoglobin : 31.0 pg  Mean Cell Hemoglobin Concentration : 32.0 g/dL  Auto Neutrophil # : 4.6 K/uL  Auto Lymphocyte # : 2.8 K/uL  Auto Monocyte # : 0.5 K/uL  Auto Eosinophil # : 0.3 K/uL  Auto Basophil # : 0.1 K/uL  Auto Neutrophil % : 56.2 %  Auto Lymphocyte % : 33.3 %  Auto Monocyte % : 6.2 %  Auto Eosinophil % : 3.5 %  Auto Basophil % : 0.7 %    10-18    140  |  102  |  47.0<H>  ----------------------------<  107  4.8   |  29.0  |  1.92<H>    Ca    8.7      18 Oct 2017 08:20  Phos  3.8     10-18  Mg     2.5     10-18      PT/INR - ( 17 Oct 2017 08:22 )   PT: 12.1 sec;   INR: 1.10 ratio         PTT - ( 17 Oct 2017 08:22 )  PTT:30.7 sec  Urinalysis Basic - ( 18 Oct 2017 08:24 )    Color: Yellow / Appearance: Clear / S.020 / pH: x  Gluc: x / Ketone: Negative  / Bili: Negative / Urobili: Negative mg/dL   Blood: x / Protein: 15 mg/dL / Nitrite: Negative   Leuk Esterase: Trace / RBC: x / WBC 3-5   Sq Epi: x / Non Sq Epi: Occasional / Bacteria: x    ASSESSMENT/PLAN:  89yFemale presenting with:  Neuro: Pain control as prescribed   Cardio: Monitor Vital signs  Pulm: Breathing Independently; Incentive spirometry and deep breathing  GI: Diet: [regular ]; Monitor bowel function  : Urinating independently  MS: Encourage OOB and ambulation; Weight Bear Status: [as tolerated]  DVT ppx:  Subq Hep INTERVAL HPI/OVERNIGHT EVENTS/SUBJECTIVE: 89yo female examined at bedside, pt and nurse state that there were no acute events or complaints overnight. Pt states that pain is well controlled and is tolerating current diet w/o any n/v/d. [+ ]OOBA, [+ ] Flatus [+ ]BM, [+ ]VOID. ISS [ 500]. Denies fever, chills, CP, SOB.    ICU Vital Signs Last 24 Hrs  T(C): 37.1 (18 Oct 2017 07:21), Max: 37.2 (17 Oct 2017 15:57)  T(F): 98.8 (18 Oct 2017 07:21), Max: 99 (17 Oct 2017 15:57)  HR: 66 (18 Oct 2017 07:21) (66 - 67)  BP: 115/68 (18 Oct 2017 07:21) (115/68 - 155/86)  RR: 20 (18 Oct 2017 07:21) (18 - 20)  SpO2: 99% (18 Oct 2017 07:21) (97% - 100%)    ABG - ( 17 Oct 2017 10:05 )  pH: 7.39  /  pCO2: 47    /  pO2: 94    / HCO3: 28    / Base Excess: 3.5   /  SaO2: 98        MEDICATIONS  (STANDING):    Musculoskeletal: No focal deficits, strength 5/5 through out all extremities bilaterally, Baseline ROM.    acetaminophen   Tablet. 975 milliGRAM(s) Oral every 8 hours  aspirin  chewable 81 milliGRAM(s) Oral daily  furosemide    Tablet 20 milliGRAM(s) Oral every other day  furosemide    Tablet 40 milliGRAM(s) Oral every other day  gabapentin 300 milliGRAM(s) Oral every 8 hours  heparin  Injectable 5000 Unit(s) SubCutaneous every 8 hours  labetalol 200 milliGRAM(s) Oral every 12 hours  lidocaine   Patch 2 Patch Transdermal every 24 hours  pantoprazole    Tablet 40 milliGRAM(s) Oral before breakfast    NUTRITION/IVF: regular diet    PHYSICAL EXAM:    Gen: Alert, NAD, Mentating well    Eyes: EOMI, PERRL    Neurological: A&Ox3, GCS 15, Baseline mental status    ENMT: Nares patent w/o discharge, MMM, no LAD     Neck: supple, no masses appreciated, Trachea midline, No JVD    Pulmonary: CTA BL, Non labored, chest rise is  symmetrical with respiration    Cardiovascular: S1S2 RRR    Gastrointestinal: Abdomen soft and non tympanic, NTND, +BS x4    Genitourinary: No Serna, no discharge or rash.    Back: No CVA tenderness    Extremities: No gross deformities or angulations, No calf tenderness, Distal pulses intact, Capillary refill < 2sec    Skin: Warm and dry, no rash.         LABS:  CBC Full  -  ( 17 Oct 2017 08:22 )  WBC Count : 8.3 K/uL  Hemoglobin : 9.8 g/dL  Hematocrit : 30.6 %  Platelet Count - Automated : 381 K/uL  Mean Cell Volume : 96.8 fl  Mean Cell Hemoglobin : 31.0 pg  Mean Cell Hemoglobin Concentration : 32.0 g/dL  Auto Neutrophil # : 4.6 K/uL  Auto Lymphocyte # : 2.8 K/uL  Auto Monocyte # : 0.5 K/uL  Auto Eosinophil # : 0.3 K/uL  Auto Basophil # : 0.1 K/uL  Auto Neutrophil % : 56.2 %  Auto Lymphocyte % : 33.3 %  Auto Monocyte % : 6.2 %  Auto Eosinophil % : 3.5 %  Auto Basophil % : 0.7 %    10-18    140  |  102  |  47.0<H>  ----------------------------<  107  4.8   |  29.0  |  1.92<H>    Ca    8.7      18 Oct 2017 08:20  Phos  3.8     10-18  Mg     2.5     10-18      PT/INR - ( 17 Oct 2017 08:22 )   PT: 12.1 sec;   INR: 1.10 ratio         PTT - ( 17 Oct 2017 08:22 )  PTT:30.7 sec  Urinalysis Basic - ( 18 Oct 2017 08:24 )    Color: Yellow / Appearance: Clear / S.020 / pH: x  Gluc: x / Ketone: Negative  / Bili: Negative / Urobili: Negative mg/dL   Blood: x / Protein: 15 mg/dL / Nitrite: Negative   Leuk Esterase: Trace / RBC: x / WBC 3-5   Sq Epi: x / Non Sq Epi: Occasional / Bacteria: x    ASSESSMENT/PLAN:  89yFemale presenting with: right sided rib fractures.  Neuro: Pain control as prescribed   Cardio: Monitor Vital signs  Pulm: Breathing Independently; Incentive spirometry and deep breathing  GI: Diet: [regular ]; Monitor bowel function  : Urinating independently  MS: Encourage OOB and ambulation; Weight Bear Status: [as tolerated]  DVT ppx:  Subq Hep

## 2017-10-18 NOTE — PROGRESS NOTE ADULT - ATTENDING COMMENTS
Patient seen and examined.  pain controlled on no narcotic multimodality analgesia.  PIC score   Heparin subQ  PO/OT dispo planning

## 2017-10-19 LAB
ANION GAP SERPL CALC-SCNC: 10 MMOL/L — SIGNIFICANT CHANGE UP (ref 5–17)
BUN SERPL-MCNC: 49 MG/DL — HIGH (ref 8–20)
CALCIUM SERPL-MCNC: 9.2 MG/DL — SIGNIFICANT CHANGE UP (ref 8.6–10.2)
CHLORIDE SERPL-SCNC: 102 MMOL/L — SIGNIFICANT CHANGE UP (ref 98–107)
CO2 SERPL-SCNC: 29 MMOL/L — SIGNIFICANT CHANGE UP (ref 22–29)
CREAT SERPL-MCNC: 1.7 MG/DL — HIGH (ref 0.5–1.3)
GLUCOSE SERPL-MCNC: 112 MG/DL — SIGNIFICANT CHANGE UP (ref 70–115)
POTASSIUM SERPL-MCNC: 4.8 MMOL/L — SIGNIFICANT CHANGE UP (ref 3.5–5.3)
POTASSIUM SERPL-SCNC: 4.8 MMOL/L — SIGNIFICANT CHANGE UP (ref 3.5–5.3)
SODIUM SERPL-SCNC: 141 MMOL/L — SIGNIFICANT CHANGE UP (ref 135–145)

## 2017-10-19 PROCEDURE — 99232 SBSQ HOSP IP/OBS MODERATE 35: CPT

## 2017-10-19 RX ADMIN — Medication 200 MILLIGRAM(S): at 18:38

## 2017-10-19 RX ADMIN — HEPARIN SODIUM 5000 UNIT(S): 5000 INJECTION INTRAVENOUS; SUBCUTANEOUS at 21:02

## 2017-10-19 RX ADMIN — GABAPENTIN 300 MILLIGRAM(S): 400 CAPSULE ORAL at 21:01

## 2017-10-19 RX ADMIN — LIDOCAINE 2 PATCH: 4 CREAM TOPICAL at 18:38

## 2017-10-19 RX ADMIN — GABAPENTIN 300 MILLIGRAM(S): 400 CAPSULE ORAL at 06:05

## 2017-10-19 RX ADMIN — Medication 975 MILLIGRAM(S): at 06:05

## 2017-10-19 RX ADMIN — Medication 200 MILLIGRAM(S): at 06:05

## 2017-10-19 RX ADMIN — HEPARIN SODIUM 5000 UNIT(S): 5000 INJECTION INTRAVENOUS; SUBCUTANEOUS at 06:05

## 2017-10-19 RX ADMIN — Medication 975 MILLIGRAM(S): at 06:47

## 2017-10-19 RX ADMIN — PANTOPRAZOLE SODIUM 40 MILLIGRAM(S): 20 TABLET, DELAYED RELEASE ORAL at 06:05

## 2017-10-19 RX ADMIN — HEPARIN SODIUM 5000 UNIT(S): 5000 INJECTION INTRAVENOUS; SUBCUTANEOUS at 15:53

## 2017-10-19 RX ADMIN — Medication 975 MILLIGRAM(S): at 00:00

## 2017-10-19 RX ADMIN — Medication 975 MILLIGRAM(S): at 21:02

## 2017-10-19 RX ADMIN — GABAPENTIN 300 MILLIGRAM(S): 400 CAPSULE ORAL at 15:52

## 2017-10-19 RX ADMIN — LIDOCAINE 2 PATCH: 4 CREAM TOPICAL at 06:07

## 2017-10-19 RX ADMIN — Medication 975 MILLIGRAM(S): at 15:52

## 2017-10-19 RX ADMIN — Medication 81 MILLIGRAM(S): at 15:52

## 2017-10-19 NOTE — PROGRESS NOTE ADULT - ASSESSMENT
Right side drib fractures, pain under control 89 year old female s/p fall found to have right side drib fractures in improving condition. Continue pain under control and encouraging ICS use. 89 year old female s/p fall found to have right side drib fractures in improving condition. Continue pain under control and encouraging ICS use. Plan for d/c to [facility] later today.

## 2017-10-19 NOTE — PROGRESS NOTE ADULT - SUBJECTIVE AND OBJECTIVE BOX
HPI/OVERNIGHT EVENTS:    MEDICATIONS  (STANDING):  acetaminophen   Tablet. 975 milliGRAM(s) Oral every 8 hours  aspirin  chewable 81 milliGRAM(s) Oral daily  furosemide    Tablet 20 milliGRAM(s) Oral every other day  furosemide    Tablet 40 milliGRAM(s) Oral every other day  gabapentin 300 milliGRAM(s) Oral every 8 hours  heparin  Injectable 5000 Unit(s) SubCutaneous every 8 hours  labetalol 200 milliGRAM(s) Oral every 12 hours  lidocaine   Patch 2 Patch Transdermal every 24 hours  pantoprazole    Tablet 40 milliGRAM(s) Oral before breakfast    MEDICATIONS  (PRN):      Vital Signs Last 24 Hrs  T(C): 37.3 (19 Oct 2017 08:23), Max: 37.5 (19 Oct 2017 00:32)  T(F): 99.1 (19 Oct 2017 08:23), Max: 99.5 (19 Oct 2017 00:32)  HR: 68 (19 Oct 2017 08:23) (68 - 75)  BP: 145/83 (19 Oct 2017 08:23) (136/60 - 152/82)  BP(mean): --  RR: 18 (19 Oct 2017 08:23) (16 - 20)  SpO2: 92% (19 Oct 2017 08:23) (92% - 100%)    Constitutional: patient resting comfortably in bed, in no acute distress  HEENT: EOMI / PERRL b/l, no active drainage or redness  Neck: No JVD, full ROM without pain  Respiratory: respirations are unlabored, no accessory muscle use, no conversational dyspnea  Cardiovascular: regular rate & rhythm  Gastrointestinal: Abdomen soft, non-tender, non-distended, no rebound tenderness / guarding  Neurological: GCS: 15 (4/5/6). A&O x 3; no gross sensory / motor / coordination deficits  Psychiatric: Normal mood, normal affect  Musculoskeletal: No joint pain, swelling or deformity; no limitation of movement      I&O's Detail      LABS:    10-19    141  |  102  |  49.0<H>  ----------------------------<  112  4.8   |  29.0  |  1.70<H>    Ca    9.2      19 Oct 2017 07:25  Phos  3.8     10-18  Mg     2.5     10-18        Urinalysis Basic - ( 18 Oct 2017 08:24 )    Color: Yellow / Appearance: Clear / S.020 / pH: x  Gluc: x / Ketone: Negative  / Bili: Negative / Urobili: Negative mg/dL   Blood: x / Protein: 15 mg/dL / Nitrite: Negative   Leuk Esterase: Trace / RBC: x / WBC 3-5   Sq Epi: x / Non Sq Epi: Occasional / Bacteria: x HPI/OVERNIGHT EVENTS: no issues. pain under control.    MEDICATIONS  (STANDING):  acetaminophen   Tablet. 975 milliGRAM(s) Oral every 8 hours  aspirin  chewable 81 milliGRAM(s) Oral daily  furosemide    Tablet 20 milliGRAM(s) Oral every other day  furosemide    Tablet 40 milliGRAM(s) Oral every other day  gabapentin 300 milliGRAM(s) Oral every 8 hours  heparin  Injectable 5000 Unit(s) SubCutaneous every 8 hours  labetalol 200 milliGRAM(s) Oral every 12 hours  lidocaine   Patch 2 Patch Transdermal every 24 hours  pantoprazole    Tablet 40 milliGRAM(s) Oral before breakfast    MEDICATIONS  (PRN):      Vital Signs Last 24 Hrs  T(C): 37.3 (19 Oct 2017 08:23), Max: 37.5 (19 Oct 2017 00:32)  T(F): 99.1 (19 Oct 2017 08:23), Max: 99.5 (19 Oct 2017 00:32)  HR: 68 (19 Oct 2017 08:23) (68 - 75)  BP: 145/83 (19 Oct 2017 08:23) (136/60 - 152/82)  BP(mean): --  RR: 18 (19 Oct 2017 08:23) (16 - 20)  SpO2: 92% (19 Oct 2017 08:23) (92% - 100%)    Constitutional: patient resting comfortably in bed, in no acute distress  HEENT: EOMI / PERRL b/l, no active drainage or redness  Neck: No JVD, full ROM without pain  Respiratory: respirations are unlabored, no accessory muscle use, no conversational dyspnea  Cardiovascular: regular rate & rhythm  Gastrointestinal: Abdomen soft, non-tender, non-distended, no rebound tenderness / guarding  Neurological: GCS: 15 (4/5/6). A&O x 3; no gross sensory / motor / coordination deficits  Psychiatric: Normal mood, normal affect  Musculoskeletal: No joint pain, swelling or deformity; no limitation of movement      I&O's Detail      LABS:    10-19    141  |  102  |  49.0<H>  ----------------------------<  112  4.8   |  29.0  |  1.70<H>    Ca    9.2      19 Oct 2017 07:25  Phos  3.8     10-18  Mg     2.5     10-18        Urinalysis Basic - ( 18 Oct 2017 08:24 )    Color: Yellow / Appearance: Clear / S.020 / pH: x  Gluc: x / Ketone: Negative  / Bili: Negative / Urobili: Negative mg/dL   Blood: x / Protein: 15 mg/dL / Nitrite: Negative   Leuk Esterase: Trace / RBC: x / WBC 3-5   Sq Epi: x / Non Sq Epi: Occasional / Bacteria: x HPI/OVERNIGHT EVENTS: Patient seen and examined at bedside this AM. No acute issues overnight. Reports that her pain under control. Tolerating PO diet with no n/v.     MEDICATIONS  (STANDING):  acetaminophen   Tablet. 975 milliGRAM(s) Oral every 8 hours  aspirin  chewable 81 milliGRAM(s) Oral daily  furosemide    Tablet 20 milliGRAM(s) Oral every other day  furosemide    Tablet 40 milliGRAM(s) Oral every other day  gabapentin 300 milliGRAM(s) Oral every 8 hours  heparin  Injectable 5000 Unit(s) SubCutaneous every 8 hours  labetalol 200 milliGRAM(s) Oral every 12 hours  lidocaine   Patch 2 Patch Transdermal every 24 hours  pantoprazole    Tablet 40 milliGRAM(s) Oral before breakfast    MEDICATIONS  (PRN):      Vital Signs Last 24 Hrs  T(C): 37.3 (19 Oct 2017 08:23), Max: 37.5 (19 Oct 2017 00:32)  T(F): 99.1 (19 Oct 2017 08:23), Max: 99.5 (19 Oct 2017 00:32)  HR: 68 (19 Oct 2017 08:23) (68 - 75)  BP: 145/83 (19 Oct 2017 08:23) (136/60 - 152/82)  BP(mean): --  RR: 18 (19 Oct 2017 08:23) (16 - 20)  SpO2: 92% (19 Oct 2017 08:23) (92% - 100%)    Constitutional: patient resting comfortably in bed, in no acute distress  HEENT: EOMI / PERRL b/l  Neck: No JVD, full ROM without pain  Respiratory: respirations are unlabored, no accessory muscle use, no conversational dyspnea; lidoderm patch in place   Cardiovascular: regular rate & rhythm  Gastrointestinal: Abdomen soft, non-tender, non-distended with no rebound tenderness / guarding  Neurological: GCS: 15 (4/5/6). A&O x 3; no gross sensory / motor / coordination deficits  Psychiatric: Normal mood, normal affect  Musculoskeletal: No joint pain, swelling or deformity; no limitation of movement      I&O's Detail    LABS:    10-    141  |  102  |  49.0<H>  ----------------------------<  112  4.8   |  29.0  |  1.70<H>    Ca    9.2      19 Oct 2017 07:25  Phos  3.8     10-18  Mg     2.5     10-18    Urinalysis Basic - ( 18 Oct 2017 08:24 )    Color: Yellow / Appearance: Clear / S.020 / pH: x  Gluc: x / Ketone: Negative  / Bili: Negative / Urobili: Negative mg/dL   Blood: x / Protein: 15 mg/dL / Nitrite: Negative   Leuk Esterase: Trace / RBC: x / WBC 3-5   Sq Epi: x / Non Sq Epi: Occasional / Bacteria: x HPI/OVERNIGHT EVENTS: Patient seen and examined at bedside this AM. No acute issues overnight. Reports that her pain under control. Tolerating PO diet with no n/v.     MEDICATIONS  (STANDING):  acetaminophen   Tablet. 975 milliGRAM(s) Oral every 8 hours  aspirin  chewable 81 milliGRAM(s) Oral daily  furosemide    Tablet 20 milliGRAM(s) Oral every other day  furosemide    Tablet 40 milliGRAM(s) Oral every other day  gabapentin 300 milliGRAM(s) Oral every 8 hours  heparin  Injectable 5000 Unit(s) SubCutaneous every 8 hours  labetalol 200 milliGRAM(s) Oral every 12 hours  lidocaine   Patch 2 Patch Transdermal every 24 hours  pantoprazole    Tablet 40 milliGRAM(s) Oral before breakfast    MEDICATIONS  (PRN):      Vital Signs Last 24 Hrs  T(C): 37.3 (19 Oct 2017 08:23), Max: 37.5 (19 Oct 2017 00:32)  T(F): 99.1 (19 Oct 2017 08:23), Max: 99.5 (19 Oct 2017 00:32)  HR: 68 (19 Oct 2017 08:23) (68 - 75)  BP: 145/83 (19 Oct 2017 08:23) (136/60 - 152/82)  BP(mean): --  RR: 18 (19 Oct 2017 08:23) (16 - 20)  SpO2: 92% (19 Oct 2017 08:23) (92% - 100%)    Constitutional: patient resting comfortably in bed, in no acute distress  HEENT: EOMI / PERRL b/l  Neck: No JVD, full ROM without pain  Respiratory: respirations are unlabored, no accessory muscle use, no conversational dyspnea; lidoderm patch in place   Cardiovascular: regular rate & rhythm  Gastrointestinal: abdomen soft, non-tender, non-distended with no rebound tenderness / guarding  Neurological: GCS: 15 (4/5/6). A&O x 3; no gross sensory / motor / coordination deficits  Psychiatric: Normal mood, normal affect  Musculoskeletal: Able to move extremities normally       I&O's Detail    LABS:    10-19    141  |  102  |  49.0<H>  ----------------------------<  112  4.8   |  29.0  |  1.70<H>    Ca    9.2      19 Oct 2017 07:25  Phos  3.8     10-18  Mg     2.5     10-18    Urinalysis Basic - ( 18 Oct 2017 08:24 )    Color: Yellow / Appearance: Clear / S.020 / pH: x  Gluc: x / Ketone: Negative  / Bili: Negative / Urobili: Negative mg/dL   Blood: x / Protein: 15 mg/dL / Nitrite: Negative   Leuk Esterase: Trace / RBC: x / WBC 3-5   Sq Epi: x / Non Sq Epi: Occasional / Bacteria: x HPI/OVERNIGHT EVENTS: Patient seen and examined at bedside this AM. No acute issues overnight. Reports that her pain under control. Tolerating PO diet with no n/v. Able to get to 500 on ICS. Later contacted in the PM by nursing staff due to family concern with increased slurring of speech. Given this new development, trauma team to follow up stat labs, ABG & CXR.     MEDICATIONS  (STANDING):  acetaminophen   Tablet. 975 milliGRAM(s) Oral every 8 hours  aspirin  chewable 81 milliGRAM(s) Oral daily  furosemide    Tablet 20 milliGRAM(s) Oral every other day  furosemide    Tablet 40 milliGRAM(s) Oral every other day  gabapentin 300 milliGRAM(s) Oral every 8 hours  heparin  Injectable 5000 Unit(s) SubCutaneous every 8 hours  labetalol 200 milliGRAM(s) Oral every 12 hours  lidocaine   Patch 2 Patch Transdermal every 24 hours  pantoprazole    Tablet 40 milliGRAM(s) Oral before breakfast    MEDICATIONS  (PRN):      Vital Signs Last 24 Hrs  T(C): 37.3 (19 Oct 2017 08:23), Max: 37.5 (19 Oct 2017 00:32)  T(F): 99.1 (19 Oct 2017 08:23), Max: 99.5 (19 Oct 2017 00:32)  HR: 68 (19 Oct 2017 08:23) (68 - 75)  BP: 145/83 (19 Oct 2017 08:23) (136/60 - 152/82)  BP(mean): --  RR: 18 (19 Oct 2017 08:23) (16 - 20)  SpO2: 92% (19 Oct 2017 08:23) (92% - 100%)    Constitutional: patient resting comfortably in bed, in no acute distress  HEENT: EOMI / PERRL b/l  Neck: No JVD, full ROM without pain  Respiratory: respirations are unlabored, no accessory muscle use, no conversational dyspnea; lidoderm patch in place   Cardiovascular: regular rate & rhythm  Gastrointestinal: abdomen soft, non-tender, non-distended with no rebound tenderness / guarding  Neurological: GCS: 15 (4/5/6). A&O x 3; no gross sensory / motor / coordination deficits; cranial nerves grossly intact   Psychiatric: Normal mood, normal affect  Musculoskeletal: Able to move extremities normally       I&O's Detail    LABS:    10-    141  |  102  |  49.0<H>  ----------------------------<  112  4.8   |  29.0  |  1.70<H>    Ca    9.2      19 Oct 2017 07:25  Phos  3.8     10-18  Mg     2.5     10-18    Urinalysis Basic - ( 18 Oct 2017 08:24 )    Color: Yellow / Appearance: Clear / S.020 / pH: x  Gluc: x / Ketone: Negative  / Bili: Negative / Urobili: Negative mg/dL   Blood: x / Protein: 15 mg/dL / Nitrite: Negative   Leuk Esterase: Trace / RBC: x / WBC 3-5   Sq Epi: x / Non Sq Epi: Occasional / Bacteria: x

## 2017-10-19 NOTE — PROGRESS NOTE ADULT - ATTENDING COMMENTS
Patient seen and examined.  pain under control.  off oxygen.  PT/OT  Dsipo planning. Patient seen and examined.  pain under control.  off oxygen.  PT/OT  Dispo planning. Patient seen and examined.  pain under control.  off oxygen.  PT/OT  Dispo planning

## 2017-10-19 NOTE — PROGRESS NOTE ADULT - SUBJECTIVE AND OBJECTIVE BOX
Patient in bed. Having some pain in her chest.   Really needs to urinate and has been waiting 15 minutes.     FUNCTIONAL PROGRESS  [facility]    REVIEW OF SYSTEMS  Constitutional - No fever,  +fatigue  Musculoskeletal - +joint pain, No joint swelling, No muscle pain  Psychiatric - No depression, +anxiety    VITALS  T(C): 37.3 (10-19-17 @ 08:23), Max: 37.5 (10-19-17 @ 00:32)  HR: 68 (10-19-17 @ 08:23) (68 - 75)  BP: 145/83 (10-19-17 @ 08:23) (136/60 - 152/82)  RR: 18 (10-19-17 @ 08:23) (16 - 20)  SpO2: 92% (10-19-17 @ 08:23) (92% - 100%)  Wt(kg): --    MEDICATIONS   acetaminophen   Tablet. 975 milliGRAM(s) every 8 hours  aspirin  chewable 81 milliGRAM(s) daily  furosemide    Tablet 20 milliGRAM(s) every other day  furosemide    Tablet 40 milliGRAM(s) every other day  gabapentin 300 milliGRAM(s) every 8 hours  heparin  Injectable 5000 Unit(s) every 8 hours  labetalol 200 milliGRAM(s) every 12 hours  lidocaine   Patch 2 Patch every 24 hours  pantoprazole    Tablet 40 milliGRAM(s) before breakfast      RECENT LABS/IMAGING    10-19    141  |  102  |  49.0<H>  ----------------------------<  112  4.8   |  29.0  |  1.70<H>    Ca    9.2      19 Oct 2017 07:25  Phos  3.8     10  Mg     2.5     10-      Urinalysis Basic - ( 18 Oct 2017 08:24 )    Color: Yellow / Appearance: Clear / S.020 / pH: x  Gluc: x / Ketone: Negative  / Bili: Negative / Urobili: Negative mg/dL   Blood: x / Protein: 15 mg/dL / Nitrite: Negative   Leuk Esterase: Trace / RBC: x / WBC 3-5   Sq Epi: x / Non Sq Epi: Occasional / Bacteria: x    PHYSICAL EXAM  Constitutional - NAD, Uncomfortable  Extremities - No C/C/E, No calf tenderness   Neurologic Exam -                    Cognitive - Awake, Alert, AAO to self, place, date, year, situation     Motor - No focal deficits  Psychiatric - Affect anxious    ASSESSMENT/PLAN  89F sustaining multiple right rib fractures secondary to a fall, now with  functional deficits  Rehab - recommend [facility], patient does not meet acute inpatient rehab criteria.  Pain control - Consider short course of NSAID's to help relieve right rib pain, Lidoderm, Gabapentin, Tylenol SESAR  DVT Prophylaxis - Heparin  Weight bearing status - Full weight bearing  Skin - Turn Q2hrs  Diet - Regular/thins    Will sign off.

## 2017-10-20 LAB
ANION GAP SERPL CALC-SCNC: 11 MMOL/L — SIGNIFICANT CHANGE UP (ref 5–17)
BASE EXCESS BLDA CALC-SCNC: 3.5 MMOL/L — HIGH (ref -3–3)
BLOOD GAS COMMENTS ARTERIAL: SIGNIFICANT CHANGE UP
BUN SERPL-MCNC: 50 MG/DL — HIGH (ref 8–20)
CALCIUM SERPL-MCNC: 9 MG/DL — SIGNIFICANT CHANGE UP (ref 8.6–10.2)
CHLORIDE SERPL-SCNC: 100 MMOL/L — SIGNIFICANT CHANGE UP (ref 98–107)
CO2 SERPL-SCNC: 27 MMOL/L — SIGNIFICANT CHANGE UP (ref 22–29)
CREAT SERPL-MCNC: 1.62 MG/DL — HIGH (ref 0.5–1.3)
GAS PNL BLDA: SIGNIFICANT CHANGE UP
GLUCOSE SERPL-MCNC: 100 MG/DL — SIGNIFICANT CHANGE UP (ref 70–115)
HCO3 BLDA-SCNC: 28 MMOL/L — HIGH (ref 20–26)
HOROWITZ INDEX BLDA+IHG-RTO: SIGNIFICANT CHANGE UP
MAGNESIUM SERPL-MCNC: 2.4 MG/DL — SIGNIFICANT CHANGE UP (ref 1.6–2.6)
PCO2 BLDA: 50 MMHG — HIGH (ref 35–45)
PH BLDA: 7.38 — SIGNIFICANT CHANGE UP (ref 7.35–7.45)
PHOSPHATE SERPL-MCNC: 4.4 MG/DL — SIGNIFICANT CHANGE UP (ref 2.4–4.7)
PO2 BLDA: 105 MMHG — SIGNIFICANT CHANGE UP (ref 83–108)
POTASSIUM SERPL-MCNC: 5.2 MMOL/L — SIGNIFICANT CHANGE UP (ref 3.5–5.3)
POTASSIUM SERPL-SCNC: 5.2 MMOL/L — SIGNIFICANT CHANGE UP (ref 3.5–5.3)
SAO2 % BLDA: 98 % — SIGNIFICANT CHANGE UP (ref 95–99)
SODIUM SERPL-SCNC: 138 MMOL/L — SIGNIFICANT CHANGE UP (ref 135–145)

## 2017-10-20 PROCEDURE — 93010 ELECTROCARDIOGRAM REPORT: CPT

## 2017-10-20 PROCEDURE — 70450 CT HEAD/BRAIN W/O DYE: CPT | Mod: 26

## 2017-10-20 RX ORDER — FUROSEMIDE 40 MG
1 TABLET ORAL
Qty: 0 | Refills: 0 | COMMUNITY
Start: 2017-10-20

## 2017-10-20 RX ORDER — GABAPENTIN 400 MG/1
1 CAPSULE ORAL
Qty: 0 | Refills: 0 | COMMUNITY
Start: 2017-10-20

## 2017-10-20 RX ORDER — LIDOCAINE 4 G/100G
0 CREAM TOPICAL
Qty: 0 | Refills: 0 | COMMUNITY
Start: 2017-10-20

## 2017-10-20 RX ORDER — DOCUSATE SODIUM 100 MG
100 CAPSULE ORAL THREE TIMES A DAY
Qty: 0 | Refills: 0 | Status: DISCONTINUED | OUTPATIENT
Start: 2017-10-20 | End: 2017-10-24

## 2017-10-20 RX ORDER — SENNA PLUS 8.6 MG/1
2 TABLET ORAL AT BEDTIME
Qty: 0 | Refills: 0 | Status: DISCONTINUED | OUTPATIENT
Start: 2017-10-20 | End: 2017-10-24

## 2017-10-20 RX ORDER — SENNA PLUS 8.6 MG/1
2 TABLET ORAL
Qty: 0 | Refills: 0 | COMMUNITY
Start: 2017-10-20

## 2017-10-20 RX ORDER — FUROSEMIDE 40 MG
40 TABLET ORAL EVERY OTHER DAY
Qty: 0 | Refills: 0 | Status: DISCONTINUED | OUTPATIENT
Start: 2017-10-21 | End: 2017-10-24

## 2017-10-20 RX ORDER — FUROSEMIDE 40 MG
20 TABLET ORAL EVERY OTHER DAY
Qty: 0 | Refills: 0 | Status: DISCONTINUED | OUTPATIENT
Start: 2017-10-20 | End: 2017-10-24

## 2017-10-20 RX ORDER — DOCUSATE SODIUM 100 MG
1 CAPSULE ORAL
Qty: 0 | Refills: 0 | COMMUNITY
Start: 2017-10-20

## 2017-10-20 RX ORDER — ACETAMINOPHEN 500 MG
3 TABLET ORAL
Qty: 0 | Refills: 0 | COMMUNITY
Start: 2017-10-20

## 2017-10-20 RX ORDER — FUROSEMIDE 40 MG
20 TABLET ORAL
Qty: 0 | Refills: 0 | Status: DISCONTINUED | OUTPATIENT
Start: 2017-10-20 | End: 2017-10-20

## 2017-10-20 RX ADMIN — Medication 200 MILLIGRAM(S): at 06:09

## 2017-10-20 RX ADMIN — GABAPENTIN 300 MILLIGRAM(S): 400 CAPSULE ORAL at 13:24

## 2017-10-20 RX ADMIN — HEPARIN SODIUM 5000 UNIT(S): 5000 INJECTION INTRAVENOUS; SUBCUTANEOUS at 21:35

## 2017-10-20 RX ADMIN — Medication 100 MILLIGRAM(S): at 21:35

## 2017-10-20 RX ADMIN — GABAPENTIN 300 MILLIGRAM(S): 400 CAPSULE ORAL at 21:35

## 2017-10-20 RX ADMIN — PANTOPRAZOLE SODIUM 40 MILLIGRAM(S): 20 TABLET, DELAYED RELEASE ORAL at 06:09

## 2017-10-20 RX ADMIN — Medication 975 MILLIGRAM(S): at 22:33

## 2017-10-20 RX ADMIN — Medication 81 MILLIGRAM(S): at 11:37

## 2017-10-20 RX ADMIN — Medication 100 MILLIGRAM(S): at 13:26

## 2017-10-20 RX ADMIN — Medication 975 MILLIGRAM(S): at 14:10

## 2017-10-20 RX ADMIN — HEPARIN SODIUM 5000 UNIT(S): 5000 INJECTION INTRAVENOUS; SUBCUTANEOUS at 13:24

## 2017-10-20 RX ADMIN — HEPARIN SODIUM 5000 UNIT(S): 5000 INJECTION INTRAVENOUS; SUBCUTANEOUS at 06:09

## 2017-10-20 RX ADMIN — Medication 20 MILLIGRAM(S): at 11:53

## 2017-10-20 RX ADMIN — Medication 975 MILLIGRAM(S): at 06:09

## 2017-10-20 RX ADMIN — GABAPENTIN 300 MILLIGRAM(S): 400 CAPSULE ORAL at 06:09

## 2017-10-20 RX ADMIN — Medication 975 MILLIGRAM(S): at 13:24

## 2017-10-20 RX ADMIN — Medication 975 MILLIGRAM(S): at 21:35

## 2017-10-20 RX ADMIN — LIDOCAINE 2 PATCH: 4 CREAM TOPICAL at 06:12

## 2017-10-20 RX ADMIN — Medication 10 MILLIGRAM(S): at 11:37

## 2017-10-20 RX ADMIN — SENNA PLUS 2 TABLET(S): 8.6 TABLET ORAL at 21:35

## 2017-10-20 RX ADMIN — Medication 200 MILLIGRAM(S): at 17:38

## 2017-10-20 RX ADMIN — LIDOCAINE 2 PATCH: 4 CREAM TOPICAL at 17:38

## 2017-10-21 RX ADMIN — PANTOPRAZOLE SODIUM 40 MILLIGRAM(S): 20 TABLET, DELAYED RELEASE ORAL at 06:01

## 2017-10-21 RX ADMIN — HEPARIN SODIUM 5000 UNIT(S): 5000 INJECTION INTRAVENOUS; SUBCUTANEOUS at 06:02

## 2017-10-21 RX ADMIN — Medication 100 MILLIGRAM(S): at 15:34

## 2017-10-21 RX ADMIN — Medication 200 MILLIGRAM(S): at 18:17

## 2017-10-21 RX ADMIN — HEPARIN SODIUM 5000 UNIT(S): 5000 INJECTION INTRAVENOUS; SUBCUTANEOUS at 15:35

## 2017-10-21 RX ADMIN — HEPARIN SODIUM 5000 UNIT(S): 5000 INJECTION INTRAVENOUS; SUBCUTANEOUS at 21:43

## 2017-10-21 RX ADMIN — Medication 975 MILLIGRAM(S): at 16:13

## 2017-10-21 RX ADMIN — Medication 40 MILLIGRAM(S): at 11:18

## 2017-10-21 RX ADMIN — GABAPENTIN 300 MILLIGRAM(S): 400 CAPSULE ORAL at 15:34

## 2017-10-21 RX ADMIN — Medication 975 MILLIGRAM(S): at 06:01

## 2017-10-21 RX ADMIN — Medication 100 MILLIGRAM(S): at 21:43

## 2017-10-21 RX ADMIN — LIDOCAINE 2 PATCH: 4 CREAM TOPICAL at 06:02

## 2017-10-21 RX ADMIN — SENNA PLUS 2 TABLET(S): 8.6 TABLET ORAL at 21:43

## 2017-10-21 RX ADMIN — GABAPENTIN 300 MILLIGRAM(S): 400 CAPSULE ORAL at 21:43

## 2017-10-21 RX ADMIN — Medication 200 MILLIGRAM(S): at 06:01

## 2017-10-21 RX ADMIN — GABAPENTIN 300 MILLIGRAM(S): 400 CAPSULE ORAL at 06:01

## 2017-10-21 RX ADMIN — Medication 975 MILLIGRAM(S): at 06:45

## 2017-10-21 RX ADMIN — Medication 100 MILLIGRAM(S): at 06:01

## 2017-10-21 RX ADMIN — Medication 975 MILLIGRAM(S): at 15:34

## 2017-10-21 RX ADMIN — Medication 81 MILLIGRAM(S): at 11:18

## 2017-10-21 RX ADMIN — LIDOCAINE 2 PATCH: 4 CREAM TOPICAL at 18:17

## 2017-10-21 RX ADMIN — Medication 975 MILLIGRAM(S): at 21:43

## 2017-10-21 NOTE — PROGRESS NOTE ADULT - NSHPATTENDINGPLANDISCUSS_GEN_ALL_CORE
Patient, ACS team, all questions answered.
Patient, ACS team. all questions answered.
Patient, all questions answered.
Patient, all questions answered.

## 2017-10-21 NOTE — PROGRESS NOTE ADULT - SUBJECTIVE AND OBJECTIVE BOX
HPI/OVERNIGHT EVENTS: Patient seen and examined at bedside this AM. No acute events overnight.     MEDICATIONS  (STANDING):  acetaminophen   Tablet. 975 milliGRAM(s) Oral every 8 hours  aspirin  chewable 81 milliGRAM(s) Oral daily  docusate sodium 100 milliGRAM(s) Oral three times a day  furosemide    Tablet 20 milliGRAM(s) Oral every other day  furosemide    Tablet 40 milliGRAM(s) Oral every other day  gabapentin 300 milliGRAM(s) Oral every 8 hours  heparin  Injectable 5000 Unit(s) SubCutaneous every 8 hours  labetalol 200 milliGRAM(s) Oral every 12 hours  lidocaine   Patch 2 Patch Transdermal every 24 hours  pantoprazole    Tablet 40 milliGRAM(s) Oral before breakfast  senna 2 Tablet(s) Oral at bedtime    MEDICATIONS  (PRN):      Vital Signs Last 24 Hrs  T(C): 36.8 (21 Oct 2017 07:44), Max: 37.1 (20 Oct 2017 23:27)  T(F): 98.2 (21 Oct 2017 07:44), Max: 98.8 (20 Oct 2017 23:27)  HR: 68 (21 Oct 2017 07:44) (68 - 82)  BP: 133/74 (21 Oct 2017 07:44) (121/70 - 148/77)  BP(mean): --  RR: 20 (21 Oct 2017 07:44) (18 - 20)  SpO2: 95% (21 Oct 2017 07:44) (95% - 97%)    Constitutional: patient resting comfortably in bed, in no acute distress  HEENT: EOMI / PERRL b/l, no active drainage or redness  Neck: No JVD, full ROM without pain  Respiratory: respirations are unlabored, no accessory muscle use, no conversational dyspnea  Cardiovascular: regular rate & rhythm  Gastrointestinal: Abdomen soft, non-tender, non-distended, no rebound tenderness / guarding  Neurological: GCS: 15 (4/5/6). A&O x 3; no gross sensory / motor / coordination deficits  Psychiatric: Normal mood, normal affect  Musculoskeletal: No joint pain, swelling or deformity; no limitation of movement      I&O's Detail      LABS:    10-20    138  |  100  |  50.0<H>  ----------------------------<  100  5.2   |  27.0  |  1.62<H>    Ca    9.0      20 Oct 2017 15:31  Phos  4.4     10-20  Mg     2.4     10-20 HPI/OVERNIGHT EVENTS: Patient seen and examined at bedside this AM. No acute events overnight. Reports of experiencing pain on her right lateral thoracic region (patient has rib fxs 7th-12th ribs). ABG from yesterday afternoon showed a mild increase in CO2 compared to previous one, otherwise, workup for slurred speech and right droopy eyelid per family report was negative. Gets up to 500 on ICS.     MEDICATIONS  (STANDING):  acetaminophen   Tablet. 975 milliGRAM(s) Oral every 8 hours  aspirin  chewable 81 milliGRAM(s) Oral daily  docusate sodium 100 milliGRAM(s) Oral three times a day  furosemide    Tablet 20 milliGRAM(s) Oral every other day  furosemide    Tablet 40 milliGRAM(s) Oral every other day  gabapentin 300 milliGRAM(s) Oral every 8 hours  heparin  Injectable 5000 Unit(s) SubCutaneous every 8 hours  labetalol 200 milliGRAM(s) Oral every 12 hours  lidocaine   Patch 2 Patch Transdermal every 24 hours  pantoprazole    Tablet 40 milliGRAM(s) Oral before breakfast  senna 2 Tablet(s) Oral at bedtime    MEDICATIONS  (PRN):      Vital Signs Last 24 Hrs  T(C): 36.8 (21 Oct 2017 07:44), Max: 37.1 (20 Oct 2017 23:27)  T(F): 98.2 (21 Oct 2017 07:44), Max: 98.8 (20 Oct 2017 23:27)  HR: 68 (21 Oct 2017 07:44) (68 - 82)  BP: 133/74 (21 Oct 2017 07:44) (121/70 - 148/77)  BP(mean): --  RR: 20 (21 Oct 2017 07:44) (18 - 20)  SpO2: 95% (21 Oct 2017 07:44) (95% - 97%)    Constitutional: patient resting comfortably in bed, in no acute distress  HEENT: EOMI / PERRL b/l  Neck: No JVD  Respiratory: O2 in place, respirations are unlabored, no accessory muscle use, no conversational dyspnea  Cardiovascular: regular rate & rhythm  Gastrointestinal: abdomen soft, non-tender, non-distended, no rebound tenderness / guarding  Neurological: GCS: 15 (4/5/6). A&O x 3; no gross sensory / motor / coordination deficits  Psychiatric: Normal mood, normal affect  Musculoskeletal: No joint pain, swelling or deformity; no limitation of movement    I&O's Detail      LABS:    10-20    138  |  100  |  50.0<H>  ----------------------------<  100  5.2   |  27.0  |  1.62<H>    Ca    9.0      20 Oct 2017 15:31  Phos  4.4     10-20  Mg     2.4     10-20

## 2017-10-21 NOTE — PROGRESS NOTE ADULT - ASSESSMENT
89 year old female s/p fall found to have right sided rib fractures (7th-12th right rib fx) with new issue of reported right eyelid droop and slurred speech reports per family. 89 year old female s/p fall found to have right sided rib fractures (7th-12th right rib fx) with new issue of reported right eyelid droop and slurred speech reports per family. Workup from yesterday negative for stroke.     1. Continue pain control   2. Tolerating regular diet   3. Encourage ICS and OOB/ambulation   4. DVT PPx with subQ heparin   5. Discharge to [facility]

## 2017-10-21 NOTE — PROGRESS NOTE ADULT - ATTENDING COMMENTS
Patient seen and examined.  Neurologically intact. negative wotrk up yesterday.  non focal.  continue analgesia IS  to rehab when be available.

## 2017-10-22 DIAGNOSIS — S22.41XA MULTIPLE FRACTURES OF RIBS, RIGHT SIDE, INITIAL ENCOUNTER FOR CLOSED FRACTURE: ICD-10-CM

## 2017-10-22 RX ADMIN — Medication 975 MILLIGRAM(S): at 13:59

## 2017-10-22 RX ADMIN — Medication 975 MILLIGRAM(S): at 06:22

## 2017-10-22 RX ADMIN — PANTOPRAZOLE SODIUM 40 MILLIGRAM(S): 20 TABLET, DELAYED RELEASE ORAL at 06:22

## 2017-10-22 RX ADMIN — Medication 975 MILLIGRAM(S): at 14:06

## 2017-10-22 RX ADMIN — Medication 100 MILLIGRAM(S): at 06:21

## 2017-10-22 RX ADMIN — Medication 975 MILLIGRAM(S): at 22:16

## 2017-10-22 RX ADMIN — GABAPENTIN 300 MILLIGRAM(S): 400 CAPSULE ORAL at 06:21

## 2017-10-22 RX ADMIN — Medication 975 MILLIGRAM(S): at 23:04

## 2017-10-22 RX ADMIN — Medication 200 MILLIGRAM(S): at 17:04

## 2017-10-22 RX ADMIN — Medication 100 MILLIGRAM(S): at 13:56

## 2017-10-22 RX ADMIN — LIDOCAINE 2 PATCH: 4 CREAM TOPICAL at 06:26

## 2017-10-22 RX ADMIN — Medication 20 MILLIGRAM(S): at 12:08

## 2017-10-22 RX ADMIN — HEPARIN SODIUM 5000 UNIT(S): 5000 INJECTION INTRAVENOUS; SUBCUTANEOUS at 22:16

## 2017-10-22 RX ADMIN — HEPARIN SODIUM 5000 UNIT(S): 5000 INJECTION INTRAVENOUS; SUBCUTANEOUS at 06:22

## 2017-10-22 RX ADMIN — Medication 200 MILLIGRAM(S): at 06:22

## 2017-10-22 RX ADMIN — GABAPENTIN 300 MILLIGRAM(S): 400 CAPSULE ORAL at 13:59

## 2017-10-22 RX ADMIN — LIDOCAINE 2 PATCH: 4 CREAM TOPICAL at 17:06

## 2017-10-22 RX ADMIN — Medication 81 MILLIGRAM(S): at 12:08

## 2017-10-22 RX ADMIN — Medication 975 MILLIGRAM(S): at 01:00

## 2017-10-22 RX ADMIN — HEPARIN SODIUM 5000 UNIT(S): 5000 INJECTION INTRAVENOUS; SUBCUTANEOUS at 14:00

## 2017-10-22 RX ADMIN — Medication 100 MILLIGRAM(S): at 22:16

## 2017-10-22 RX ADMIN — GABAPENTIN 300 MILLIGRAM(S): 400 CAPSULE ORAL at 22:16

## 2017-10-22 RX ADMIN — SENNA PLUS 2 TABLET(S): 8.6 TABLET ORAL at 22:17

## 2017-10-22 NOTE — PROGRESS NOTE ADULT - ASSESSMENT
89 year old female s/p fall found to have right sided rib fractures (7th-12th right rib fx) in improving condition with no new issues     1. Continue pain control regimen   2. Tolerating regular diet   3. Encourage ICS and OOB/ambulation   4. DVT PPx with subQ heparin   5. Likely discharge to [facility] tomorrow (10/23)

## 2017-10-22 NOTE — PROGRESS NOTE ADULT - ATTENDING COMMENTS
Agree with above assessment.  The patient is with only mild discomfort with movement and sneezing. The patient otherwise states she is feeling fairly well.  The patient is trauma stable and ready for discharge planning.

## 2017-10-22 NOTE — PROGRESS NOTE ADULT - SUBJECTIVE AND OBJECTIVE BOX
HPI/OVERNIGHT EVENTS:    MEDICATIONS  (STANDING):  acetaminophen   Tablet. 975 milliGRAM(s) Oral every 8 hours  aspirin  chewable 81 milliGRAM(s) Oral daily  docusate sodium 100 milliGRAM(s) Oral three times a day  furosemide    Tablet 20 milliGRAM(s) Oral every other day  furosemide    Tablet 40 milliGRAM(s) Oral every other day  gabapentin 300 milliGRAM(s) Oral every 8 hours  heparin  Injectable 5000 Unit(s) SubCutaneous every 8 hours  labetalol 200 milliGRAM(s) Oral every 12 hours  lidocaine   Patch 2 Patch Transdermal every 24 hours  pantoprazole    Tablet 40 milliGRAM(s) Oral before breakfast  senna 2 Tablet(s) Oral at bedtime    MEDICATIONS  (PRN):      Vital Signs Last 24 Hrs  T(C): 36.9 (22 Oct 2017 07:53), Max: 37.1 (21 Oct 2017 23:13)  T(F): 98.5 (22 Oct 2017 07:53), Max: 98.7 (21 Oct 2017 23:13)  HR: 72 (22 Oct 2017 07:53) (72 - 80)  BP: 115/62 (22 Oct 2017 07:53) (115/62 - 144/84)  BP(mean): --  RR: 19 (22 Oct 2017 07:53) (18 - 19)  SpO2: 98% (21 Oct 2017 23:13) (96% - 98%)    Constitutional: patient resting comfortably in bed, in no acute distress  HEENT: EOMI / PERRL b/l, no active drainage or redness  Neck: No JVD, full ROM without pain  Respiratory: respirations are unlabored, no accessory muscle use, no conversational dyspnea  Cardiovascular: regular rate & rhythm  Gastrointestinal: Abdomen soft, non-tender, non-distended, no rebound tenderness / guarding  Neurological: GCS: 15 (4/5/6). A&O x 3; no gross sensory / motor / coordination deficits  Psychiatric: Normal mood, normal affect  Musculoskeletal: No joint pain, swelling or deformity; no limitation of movement      I&O's Detail      LABS:    10-20    138  |  100  |  50.0<H>  ----------------------------<  100  5.2   |  27.0  |  1.62<H>    Ca    9.0      20 Oct 2017 15:31  Phos  4.4     10-20  Mg     2.4     10-20 HPI/OVERNIGHT EVENTS: Patient seen and examined at bedside this AM. No acute events overnight. Continues to complain of the same pain located on her right lateral thoracic region. Pain is secondary to her rib fxs (7th-12th ribs). Manages to get to 500-100 on her incentive spirometry.      MEDICATIONS  (STANDING):  acetaminophen   Tablet. 975 milliGRAM(s) Oral every 8 hours  aspirin  chewable 81 milliGRAM(s) Oral daily  docusate sodium 100 milliGRAM(s) Oral three times a day  furosemide    Tablet 20 milliGRAM(s) Oral every other day  furosemide    Tablet 40 milliGRAM(s) Oral every other day  gabapentin 300 milliGRAM(s) Oral every 8 hours  heparin  Injectable 5000 Unit(s) SubCutaneous every 8 hours  labetalol 200 milliGRAM(s) Oral every 12 hours  lidocaine   Patch 2 Patch Transdermal every 24 hours  pantoprazole    Tablet 40 milliGRAM(s) Oral before breakfast  senna 2 Tablet(s) Oral at bedtime    MEDICATIONS  (PRN):    Vital Signs Last 24 Hrs  T(C): 36.9 (22 Oct 2017 07:53), Max: 37.1 (21 Oct 2017 23:13)  T(F): 98.5 (22 Oct 2017 07:53), Max: 98.7 (21 Oct 2017 23:13)  HR: 72 (22 Oct 2017 07:53) (72 - 80)  BP: 115/62 (22 Oct 2017 07:53) (115/62 - 144/84)  BP(mean): --  RR: 19 (22 Oct 2017 07:53) (18 - 19)  SpO2: 98% (21 Oct 2017 23:13) (96% - 98%)    Constitutional: patient resting comfortably in bed, in no acute distress  HEENT: EOMI / PERRL b/l, no active drainage or redness  Neck: No JVD, full ROM without pain  Respiratory: respirations are unlabored, no accessory muscle use, no conversational dyspnea  Cardiovascular: regular rate & rhythm  Gastrointestinal: Abdomen soft, non-tender, non-distended, no rebound tenderness / guarding  Neurological: GCS: 15 (4/5/6). A&O x 3; no gross sensory / motor / coordination deficits  Psychiatric: Normal mood, normal affect  Musculoskeletal: No joint pain, swelling or deformity; no limitation of movement      I&O's Detail      LABS:    10-20    138  |  100  |  50.0<H>  ----------------------------<  100  5.2   |  27.0  |  1.62<H>    Ca    9.0      20 Oct 2017 15:31  Phos  4.4     10-20  Mg     2.4     10-20

## 2017-10-23 RX ADMIN — Medication 200 MILLIGRAM(S): at 05:23

## 2017-10-23 RX ADMIN — PANTOPRAZOLE SODIUM 40 MILLIGRAM(S): 20 TABLET, DELAYED RELEASE ORAL at 05:23

## 2017-10-23 RX ADMIN — Medication 100 MILLIGRAM(S): at 13:55

## 2017-10-23 RX ADMIN — LIDOCAINE 2 PATCH: 4 CREAM TOPICAL at 18:00

## 2017-10-23 RX ADMIN — Medication 40 MILLIGRAM(S): at 12:29

## 2017-10-23 RX ADMIN — GABAPENTIN 300 MILLIGRAM(S): 400 CAPSULE ORAL at 05:24

## 2017-10-23 RX ADMIN — Medication 200 MILLIGRAM(S): at 18:00

## 2017-10-23 RX ADMIN — SENNA PLUS 2 TABLET(S): 8.6 TABLET ORAL at 21:46

## 2017-10-23 RX ADMIN — Medication 975 MILLIGRAM(S): at 21:46

## 2017-10-23 RX ADMIN — Medication 975 MILLIGRAM(S): at 13:55

## 2017-10-23 RX ADMIN — GABAPENTIN 300 MILLIGRAM(S): 400 CAPSULE ORAL at 21:45

## 2017-10-23 RX ADMIN — Medication 100 MILLIGRAM(S): at 05:24

## 2017-10-23 RX ADMIN — HEPARIN SODIUM 5000 UNIT(S): 5000 INJECTION INTRAVENOUS; SUBCUTANEOUS at 21:46

## 2017-10-23 RX ADMIN — Medication 81 MILLIGRAM(S): at 12:29

## 2017-10-23 RX ADMIN — GABAPENTIN 300 MILLIGRAM(S): 400 CAPSULE ORAL at 13:55

## 2017-10-23 RX ADMIN — Medication 975 MILLIGRAM(S): at 05:22

## 2017-10-23 RX ADMIN — HEPARIN SODIUM 5000 UNIT(S): 5000 INJECTION INTRAVENOUS; SUBCUTANEOUS at 05:24

## 2017-10-23 RX ADMIN — Medication 975 MILLIGRAM(S): at 22:30

## 2017-10-23 RX ADMIN — Medication 100 MILLIGRAM(S): at 21:45

## 2017-10-23 RX ADMIN — HEPARIN SODIUM 5000 UNIT(S): 5000 INJECTION INTRAVENOUS; SUBCUTANEOUS at 13:55

## 2017-10-23 RX ADMIN — LIDOCAINE 2 PATCH: 4 CREAM TOPICAL at 05:32

## 2017-10-23 RX ADMIN — Medication 975 MILLIGRAM(S): at 14:45

## 2017-10-23 NOTE — PROGRESS NOTE ADULT - SUBJECTIVE AND OBJECTIVE BOX
HPI/OVERNIGHT EVENTS:    MEDICATIONS  (STANDING):  acetaminophen   Tablet. 975 milliGRAM(s) Oral every 8 hours  aspirin  chewable 81 milliGRAM(s) Oral daily  docusate sodium 100 milliGRAM(s) Oral three times a day  furosemide    Tablet 20 milliGRAM(s) Oral every other day  furosemide    Tablet 40 milliGRAM(s) Oral every other day  gabapentin 300 milliGRAM(s) Oral every 8 hours  heparin  Injectable 5000 Unit(s) SubCutaneous every 8 hours  labetalol 200 milliGRAM(s) Oral every 12 hours  lidocaine   Patch 2 Patch Transdermal every 24 hours  pantoprazole    Tablet 40 milliGRAM(s) Oral before breakfast  senna 2 Tablet(s) Oral at bedtime    MEDICATIONS  (PRN):      Vital Signs Last 24 Hrs  T(C): 37.1 (22 Oct 2017 23:12), Max: 37.1 (22 Oct 2017 15:27)  T(F): 98.8 (22 Oct 2017 23:12), Max: 98.8 (22 Oct 2017 15:27)  HR: 76 (22 Oct 2017 23:12) (74 - 76)  BP: 138/68 (22 Oct 2017 23:12) (124/65 - 138/68)  BP(mean): --  RR: 20 (22 Oct 2017 23:12) (20 - 20)  SpO2: 99% (22 Oct 2017 23:12) (97% - 99%)    Constitutional: patient resting comfortably in bed, in no acute distress  HEENT: EOMI / PERRL b/l, no active drainage or redness  Neck: No JVD, full ROM without pain  Respiratory: respirations are unlabored, no accessory muscle use, no conversational dyspnea  Cardiovascular: regular rate & rhythm  Gastrointestinal: Abdomen soft, non-tender, non-distended, no rebound tenderness / guarding  Neurological: GCS: 15 (4/5/6). A&O x 3; no gross sensory / motor / coordination deficits  Psychiatric: Normal mood, normal affect  Musculoskeletal: No joint pain, swelling or deformity; no limitation of movement      I&O's Detail      LABS:

## 2017-10-24 VITALS
HEART RATE: 76 BPM | OXYGEN SATURATION: 97 % | RESPIRATION RATE: 18 BRPM | SYSTOLIC BLOOD PRESSURE: 132 MMHG | DIASTOLIC BLOOD PRESSURE: 70 MMHG

## 2017-10-24 PROCEDURE — 72125 CT NECK SPINE W/O DYE: CPT

## 2017-10-24 PROCEDURE — 97110 THERAPEUTIC EXERCISES: CPT

## 2017-10-24 PROCEDURE — 82803 BLOOD GASES ANY COMBINATION: CPT

## 2017-10-24 PROCEDURE — 96375 TX/PRO/DX INJ NEW DRUG ADDON: CPT | Mod: XU

## 2017-10-24 PROCEDURE — 70450 CT HEAD/BRAIN W/O DYE: CPT

## 2017-10-24 PROCEDURE — 84484 ASSAY OF TROPONIN QUANT: CPT

## 2017-10-24 PROCEDURE — 93005 ELECTROCARDIOGRAM TRACING: CPT

## 2017-10-24 PROCEDURE — 97163 PT EVAL HIGH COMPLEX 45 MIN: CPT

## 2017-10-24 PROCEDURE — 85610 PROTHROMBIN TIME: CPT

## 2017-10-24 PROCEDURE — 82550 ASSAY OF CK (CPK): CPT

## 2017-10-24 PROCEDURE — 83880 ASSAY OF NATRIURETIC PEPTIDE: CPT

## 2017-10-24 PROCEDURE — 86870 RBC ANTIBODY IDENTIFICATION: CPT

## 2017-10-24 PROCEDURE — 84100 ASSAY OF PHOSPHORUS: CPT

## 2017-10-24 PROCEDURE — 74177 CT ABD & PELVIS W/CONTRAST: CPT

## 2017-10-24 PROCEDURE — 80048 BASIC METABOLIC PNL TOTAL CA: CPT

## 2017-10-24 PROCEDURE — 85730 THROMBOPLASTIN TIME PARTIAL: CPT

## 2017-10-24 PROCEDURE — 80053 COMPREHEN METABOLIC PANEL: CPT

## 2017-10-24 PROCEDURE — 97530 THERAPEUTIC ACTIVITIES: CPT

## 2017-10-24 PROCEDURE — 86880 COOMBS TEST DIRECT: CPT

## 2017-10-24 PROCEDURE — 97116 GAIT TRAINING THERAPY: CPT

## 2017-10-24 PROCEDURE — 86850 RBC ANTIBODY SCREEN: CPT

## 2017-10-24 PROCEDURE — 86901 BLOOD TYPING SEROLOGIC RH(D): CPT

## 2017-10-24 PROCEDURE — 99285 EMERGENCY DEPT VISIT HI MDM: CPT | Mod: 25

## 2017-10-24 PROCEDURE — 71045 X-RAY EXAM CHEST 1 VIEW: CPT

## 2017-10-24 PROCEDURE — 81001 URINALYSIS AUTO W/SCOPE: CPT

## 2017-10-24 PROCEDURE — 83735 ASSAY OF MAGNESIUM: CPT

## 2017-10-24 PROCEDURE — 97167 OT EVAL HIGH COMPLEX 60 MIN: CPT

## 2017-10-24 PROCEDURE — 82962 GLUCOSE BLOOD TEST: CPT

## 2017-10-24 PROCEDURE — 96374 THER/PROPH/DIAG INJ IV PUSH: CPT | Mod: XU

## 2017-10-24 PROCEDURE — 71260 CT THORAX DX C+: CPT

## 2017-10-24 PROCEDURE — 86900 BLOOD TYPING SEROLOGIC ABO: CPT

## 2017-10-24 PROCEDURE — 85027 COMPLETE CBC AUTOMATED: CPT

## 2017-10-24 PROCEDURE — 36415 COLL VENOUS BLD VENIPUNCTURE: CPT

## 2017-10-24 RX ADMIN — GABAPENTIN 300 MILLIGRAM(S): 400 CAPSULE ORAL at 05:14

## 2017-10-24 RX ADMIN — HEPARIN SODIUM 5000 UNIT(S): 5000 INJECTION INTRAVENOUS; SUBCUTANEOUS at 15:06

## 2017-10-24 RX ADMIN — Medication 200 MILLIGRAM(S): at 18:00

## 2017-10-24 RX ADMIN — PANTOPRAZOLE SODIUM 40 MILLIGRAM(S): 20 TABLET, DELAYED RELEASE ORAL at 05:14

## 2017-10-24 RX ADMIN — LIDOCAINE 2 PATCH: 4 CREAM TOPICAL at 18:01

## 2017-10-24 RX ADMIN — Medication 975 MILLIGRAM(S): at 16:06

## 2017-10-24 RX ADMIN — Medication 100 MILLIGRAM(S): at 15:06

## 2017-10-24 RX ADMIN — GABAPENTIN 300 MILLIGRAM(S): 400 CAPSULE ORAL at 15:06

## 2017-10-24 RX ADMIN — Medication 20 MILLIGRAM(S): at 11:30

## 2017-10-24 RX ADMIN — Medication 975 MILLIGRAM(S): at 15:06

## 2017-10-24 RX ADMIN — Medication 200 MILLIGRAM(S): at 05:14

## 2017-10-24 RX ADMIN — HEPARIN SODIUM 5000 UNIT(S): 5000 INJECTION INTRAVENOUS; SUBCUTANEOUS at 05:14

## 2017-10-24 RX ADMIN — Medication 975 MILLIGRAM(S): at 05:45

## 2017-10-24 RX ADMIN — Medication 100 MILLIGRAM(S): at 05:14

## 2017-10-24 RX ADMIN — Medication 81 MILLIGRAM(S): at 11:30

## 2017-10-24 RX ADMIN — Medication 975 MILLIGRAM(S): at 05:14

## 2017-10-24 RX ADMIN — LIDOCAINE 2 PATCH: 4 CREAM TOPICAL at 05:20

## 2017-10-24 NOTE — PROGRESS NOTE ADULT - ASSESSMENT
89 y/o F with Rt 7-12 rib fracture s/p fall  - pt awaiting for [facility] placement  - continue regular diet  - pain control  - PT/OT  - encourage incentive spirometry  - DVT prophylaxis: heparin SC

## 2017-10-24 NOTE — OCCUPATIONAL THERAPY INITIAL EVALUATION ADULT - PERTINENT HX OF CURRENT PROBLEM, REHAB EVAL
Pt is a 89 y/o female who presented from home with Right sided weakness, pain and SOB, s/p fall + Rib fx's

## 2017-10-24 NOTE — PROGRESS NOTE ADULT - SUBJECTIVE AND OBJECTIVE BOX
pt seen and examined at bed side  no acute events overnight  tolerating diet  complaints of pain over the broken ribs on the right, pulling 750 in Incentive spirometry    MEDICATIONS  (STANDING):  acetaminophen   Tablet. 975 milliGRAM(s) Oral every 8 hours  aspirin  chewable 81 milliGRAM(s) Oral daily  docusate sodium 100 milliGRAM(s) Oral three times a day  furosemide    Tablet 20 milliGRAM(s) Oral every other day  furosemide    Tablet 40 milliGRAM(s) Oral every other day  gabapentin 300 milliGRAM(s) Oral every 8 hours  heparin  Injectable 5000 Unit(s) SubCutaneous every 8 hours  labetalol 200 milliGRAM(s) Oral every 12 hours  lidocaine   Patch 2 Patch Transdermal every 24 hours  pantoprazole    Tablet 40 milliGRAM(s) Oral before breakfast  senna 2 Tablet(s) Oral at bedtime    MEDICATIONS  (PRN):      Vital Signs Last 24 Hrs  T(C): 36.9 (24 Oct 2017 09:45), Max: 36.9 (24 Oct 2017 09:45)  T(F): 98.4 (24 Oct 2017 09:45), Max: 98.4 (24 Oct 2017 09:45)  HR: 68 (24 Oct 2017 09:45) (68 - 76)  BP: 123/64 (24 Oct 2017 11:30) (123/64 - 135/69)  BP(mean): --  RR: 18 (24 Oct 2017 09:45) (18 - 18)  SpO2: 99% (24 Oct 2017 09:45) (97% - 99%)    Constitutional: NAD, well-groomed, well-developed  Respiratory: Breath Sounds equal & clear to percussion & auscultation, no accessory muscle use  Cardiovascular: Regular rate & rhythm, normal S1, S2; no murmurs, gallops or rubs; no S3, S4  Gastrointestinal: Soft, non-tender, no hepatosplenomegaly, normal bowel sounds  Extremities: No peripheral edema, No cyanosis, clubbing   Vascular: Equal and normal pulses: 2+ peripheral pulses throughout  Neurological: GCS: 15. A&O x 3; no sensory, motor or coordination deficits, normal reflexes  Psychiatric: Normal mood, normal affect  Musculoskeletal: No joint pain, swelling or deformity; no limitation of movement  Skin: No rashes      I&O's Detail      LABS:                RADIOLOGY & ADDITIONAL STUDIES:

## 2017-10-24 NOTE — OCCUPATIONAL THERAPY INITIAL EVALUATION ADULT - PHYSICAL ASSIST/NONPHYSICAL ASSIST: SIT/STAND, REHAB EVAL
Go to lab for blood work, urine and stool sample    Schedule pelvic Ultrasound    Physical therapy will call to schedule an appointment    Follow up with internal medicine regarding diarrhea and flushing  
1 person assist/verbal cues

## 2017-10-24 NOTE — OCCUPATIONAL THERAPY INITIAL EVALUATION ADULT - RANGE OF MOTION EXAMINATION, UPPER EXTREMITY
Left UE Active ROM was WNL (within normal limits)/Right UE Active ROM was WNL (within normal limits)

## 2017-10-24 NOTE — OCCUPATIONAL THERAPY INITIAL EVALUATION ADULT - PLANNED THERAPY INTERVENTIONS, OT EVAL
bed mobility training/ROM/ADL retraining/balance training/transfer training/neuromuscular re-education/parent/caregiver training...

## 2017-11-13 PROBLEM — I10 ESSENTIAL (PRIMARY) HYPERTENSION: Chronic | Status: ACTIVE | Noted: 2017-10-16

## 2017-11-13 PROBLEM — Z00.00 ENCOUNTER FOR PREVENTIVE HEALTH EXAMINATION: Status: ACTIVE | Noted: 2017-11-13

## 2017-11-30 ENCOUNTER — APPOINTMENT (OUTPATIENT)
Dept: TRAUMA SURGERY | Facility: CLINIC | Age: 82
End: 2017-11-30
Payer: MEDICARE

## 2017-11-30 VITALS
HEART RATE: 88 BPM | HEIGHT: 61 IN | BODY MASS INDEX: 26.81 KG/M2 | DIASTOLIC BLOOD PRESSURE: 75 MMHG | WEIGHT: 142 LBS | SYSTOLIC BLOOD PRESSURE: 122 MMHG | OXYGEN SATURATION: 95 % | TEMPERATURE: 98.4 F

## 2017-11-30 DIAGNOSIS — Z87.898 PERSONAL HISTORY OF OTHER SPECIFIED CONDITIONS: ICD-10-CM

## 2017-11-30 DIAGNOSIS — Z87.39 PERSONAL HISTORY OF OTHER DISEASES OF THE MUSCULOSKELETAL SYSTEM AND CONNECTIVE TISSUE: ICD-10-CM

## 2017-11-30 DIAGNOSIS — Z82.49 FAMILY HISTORY OF ISCHEMIC HEART DISEASE AND OTHER DISEASES OF THE CIRCULATORY SYSTEM: ICD-10-CM

## 2017-11-30 DIAGNOSIS — Z78.9 OTHER SPECIFIED HEALTH STATUS: ICD-10-CM

## 2017-11-30 DIAGNOSIS — Z80.3 FAMILY HISTORY OF MALIGNANT NEOPLASM OF BREAST: ICD-10-CM

## 2017-11-30 DIAGNOSIS — Z82.3 FAMILY HISTORY OF STROKE: ICD-10-CM

## 2017-11-30 DIAGNOSIS — S22.39XA FRACTURE OF ONE RIB, UNSPECIFIED SIDE, INITIAL ENCOUNTER FOR CLOSED FRACTURE: ICD-10-CM

## 2017-11-30 DIAGNOSIS — Z86.2 PERSONAL HISTORY OF DISEASES OF THE BLOOD AND BLOOD-FORMING ORGANS AND CERTAIN DISORDERS INVOLVING THE IMMUNE MECHANISM: ICD-10-CM

## 2017-11-30 DIAGNOSIS — Z86.79 PERSONAL HISTORY OF OTHER DISEASES OF THE CIRCULATORY SYSTEM: ICD-10-CM

## 2017-11-30 PROCEDURE — 99213 OFFICE O/P EST LOW 20 MIN: CPT

## 2017-12-04 PROBLEM — S22.39XA RIB FRACTURES: Status: ACTIVE | Noted: 2017-12-04

## 2024-05-12 NOTE — PROGRESS NOTE ADULT - ASSESSMENT
89 year old female s/p fall without syncope sustaining right sided rib fractures (7-12th)    Plan:  -rib fracture protocol  -daily PIC score  -encourage IS use  -continue regular diet  -PT/OT  -DVT prophylaxis - - -

## 2024-11-01 NOTE — ED ADULT NURSE NOTE - CADM POA CENTRAL LINE
[facility]: Cancer Care                                                                                          I called Cam to assist in scheduling a follow-up appointment.  I was unable to get a hold of him.  Tried calling the number on file and it gave me a busy signal.  I will reattempt again next week.      Signature:  Felisa Boudreaux RN   No

## 2025-07-22 NOTE — ED PROVIDER NOTE - NSCAREINITIATED _GEN_ER
In ED/UC, and had a positive pregnancy test at [facility]. ED was unable to find baby on US. Continues to have bleeding and abd cramping today and looking for a second opinion.      Triage Assessment (Adult)       Row Name 07/22/25 1122          Triage Assessment    Airway WDL WDL        Respiratory WDL    Respiratory WDL WDL        Cardiac WDL    Cardiac WDL WDL        Peripheral/Neurovascular WDL    Peripheral Neurovascular WDL WDL        Cognitive/Neuro/Behavioral WDL    Cognitive/Neuro/Behavioral WDL WDL                      Zeke De La Vega(Attending)